# Patient Record
Sex: FEMALE | Race: WHITE | NOT HISPANIC OR LATINO | URBAN - METROPOLITAN AREA
[De-identification: names, ages, dates, MRNs, and addresses within clinical notes are randomized per-mention and may not be internally consistent; named-entity substitution may affect disease eponyms.]

---

## 2023-02-02 PROBLEM — R79.89 ELEVATED LFTS: Status: ACTIVE | Noted: 2023-02-02

## 2023-02-02 PROBLEM — E78.1 TYPE 2 DIABETES MELLITUS WITH HYPERTRIGLYCERIDEMIA (MULTI): Status: ACTIVE | Noted: 2023-02-02

## 2023-02-02 PROBLEM — E11.9 DIABETES MELLITUS, TYPE 2 (MULTI): Status: ACTIVE | Noted: 2023-02-02

## 2023-02-02 PROBLEM — E03.9 HYPOTHYROIDISM: Status: ACTIVE | Noted: 2023-02-02

## 2023-02-02 PROBLEM — R53.83 FATIGUE: Status: ACTIVE | Noted: 2023-02-02

## 2023-02-02 PROBLEM — R01.1 MURMUR, CARDIAC: Status: ACTIVE | Noted: 2023-02-02

## 2023-02-02 PROBLEM — I35.2 AORTIC INSUFFICIENCY WITH AORTIC STENOSIS: Status: ACTIVE | Noted: 2023-02-02

## 2023-02-02 PROBLEM — K29.70 GASTRITIS: Status: ACTIVE | Noted: 2023-02-02

## 2023-02-02 PROBLEM — E66.812 CLASS 2 OBESITY WITH BODY MASS INDEX (BMI) OF 36.0 TO 36.9 IN ADULT: Status: ACTIVE | Noted: 2023-02-02

## 2023-02-02 PROBLEM — D72.829 LEUKOCYTOSIS: Status: ACTIVE | Noted: 2023-02-02

## 2023-02-02 PROBLEM — R10.9 RIGHT FLANK PAIN: Status: ACTIVE | Noted: 2023-02-02

## 2023-02-02 PROBLEM — R79.89 ELEVATED SERUM CREATININE: Status: ACTIVE | Noted: 2023-02-02

## 2023-02-02 PROBLEM — N28.89 RENAL MASS, LEFT: Status: ACTIVE | Noted: 2023-02-02

## 2023-02-02 PROBLEM — E66.01 CLASS 2 SEVERE OBESITY WITH SERIOUS COMORBIDITY AND BODY MASS INDEX (BMI) OF 36.0 TO 36.9 IN ADULT (MULTI): Status: ACTIVE | Noted: 2023-02-02

## 2023-02-02 PROBLEM — I26.99 PULMONARY EMBOLISM (MULTI): Status: ACTIVE | Noted: 2023-02-02

## 2023-02-02 PROBLEM — E78.5 HLD (HYPERLIPIDEMIA): Status: ACTIVE | Noted: 2023-02-02

## 2023-02-02 PROBLEM — I26.99 PULMONARY EMBOLISM, BILATERAL (MULTI): Status: ACTIVE | Noted: 2023-02-02

## 2023-02-02 PROBLEM — E66.9 CLASS 2 OBESITY WITH BODY MASS INDEX (BMI) OF 36.0 TO 36.9 IN ADULT: Status: ACTIVE | Noted: 2023-02-02

## 2023-02-02 PROBLEM — M67.90 NODULE OF FLEXOR TENDON SHEATH: Status: ACTIVE | Noted: 2023-02-02

## 2023-02-02 PROBLEM — M25.562 LEFT KNEE PAIN: Status: ACTIVE | Noted: 2023-02-02

## 2023-02-02 PROBLEM — E11.69 TYPE 2 DIABETES MELLITUS WITH HYPERTRIGLYCERIDEMIA (MULTI): Status: ACTIVE | Noted: 2023-02-02

## 2023-02-02 PROBLEM — G47.00 INSOMNIA: Status: ACTIVE | Noted: 2023-02-02

## 2023-02-02 PROBLEM — D64.9 ANEMIA: Status: ACTIVE | Noted: 2023-02-02

## 2023-02-02 PROBLEM — E11.22: Status: ACTIVE | Noted: 2023-02-02

## 2023-02-02 PROBLEM — I35.1 MODERATE AORTIC REGURGITATION: Status: ACTIVE | Noted: 2023-02-02

## 2023-02-02 PROBLEM — E78.1 HYPERTRIGLYCERIDEMIA: Status: ACTIVE | Noted: 2023-02-02

## 2023-02-02 PROBLEM — E83.52 HYPERCALCEMIA: Status: ACTIVE | Noted: 2023-02-02

## 2023-02-02 PROBLEM — E66.812 CLASS 2 SEVERE OBESITY WITH SERIOUS COMORBIDITY AND BODY MASS INDEX (BMI) OF 36.0 TO 36.9 IN ADULT: Status: ACTIVE | Noted: 2023-02-02

## 2023-02-02 PROBLEM — K63.5 COLON POLYP: Status: ACTIVE | Noted: 2023-02-02

## 2023-02-02 PROBLEM — E66.9 OBESITY: Status: ACTIVE | Noted: 2023-02-02

## 2023-02-02 PROBLEM — I10 HYPERTENSION: Status: ACTIVE | Noted: 2023-02-02

## 2023-02-02 PROBLEM — I35.0 AORTIC STENOSIS: Status: ACTIVE | Noted: 2023-02-02

## 2023-02-02 PROBLEM — N18.31 CKD STAGE G3A/A1, GFR 45-59 AND ALBUMIN CREATININE RATIO <30 MG/G (MULTI): Status: ACTIVE | Noted: 2023-02-02

## 2023-02-02 RX ORDER — ASPIRIN 81 MG/1
1 TABLET ORAL DAILY
COMMUNITY
Start: 2020-01-30

## 2023-02-02 RX ORDER — ESTRADIOL 1 MG/1
1 TABLET ORAL DAILY
COMMUNITY
Start: 2020-01-30

## 2023-02-02 RX ORDER — LISINOPRIL AND HYDROCHLOROTHIAZIDE 12.5; 2 MG/1; MG/1
1 TABLET ORAL DAILY
COMMUNITY
Start: 2020-01-30 | End: 2023-03-16 | Stop reason: SDUPTHER

## 2023-02-02 RX ORDER — TRAZODONE HYDROCHLORIDE 50 MG/1
1-2 TABLET ORAL NIGHTLY PRN
COMMUNITY
Start: 2020-01-30 | End: 2023-03-16 | Stop reason: SDUPTHER

## 2023-02-02 RX ORDER — BLOOD SUGAR DIAGNOSTIC
STRIP MISCELLANEOUS
COMMUNITY
Start: 2022-04-26

## 2023-02-02 RX ORDER — LEVOTHYROXINE SODIUM 50 UG/1
1 TABLET ORAL DAILY
COMMUNITY
Start: 2020-01-30 | End: 2023-03-16 | Stop reason: SDUPTHER

## 2023-02-02 RX ORDER — MULTIVIT-MIN/FA/LYCOPEN/LUTEIN .4-300-25
1 TABLET ORAL DAILY
COMMUNITY
Start: 2020-01-30

## 2023-02-02 RX ORDER — METFORMIN HYDROCHLORIDE 1000 MG/1
1 TABLET ORAL
COMMUNITY
Start: 2020-01-30 | End: 2023-03-16 | Stop reason: SDUPTHER

## 2023-03-15 ASSESSMENT — ENCOUNTER SYMPTOMS
DIARRHEA: 0
UNEXPECTED WEIGHT CHANGE: 0
PALPITATIONS: 0
VOMITING: 0
BLOOD IN STOOL: 0
NAUSEA: 0
CHEST TIGHTNESS: 0
SHORTNESS OF BREATH: 0
ABDOMINAL PAIN: 0
APPETITE CHANGE: 0
ACTIVITY CHANGE: 0
CONSTIPATION: 0

## 2023-03-16 ENCOUNTER — OFFICE VISIT (OUTPATIENT)
Dept: PRIMARY CARE | Facility: CLINIC | Age: 73
End: 2023-03-16
Payer: MEDICARE

## 2023-03-16 VITALS
SYSTOLIC BLOOD PRESSURE: 118 MMHG | DIASTOLIC BLOOD PRESSURE: 62 MMHG | BODY MASS INDEX: 34.78 KG/M2 | HEART RATE: 100 BPM | HEIGHT: 61 IN | WEIGHT: 184.2 LBS | TEMPERATURE: 97.1 F

## 2023-03-16 DIAGNOSIS — E03.9 HYPOTHYROIDISM, UNSPECIFIED TYPE: ICD-10-CM

## 2023-03-16 DIAGNOSIS — Z00.00 MEDICARE ANNUAL WELLNESS VISIT, SUBSEQUENT: Primary | ICD-10-CM

## 2023-03-16 DIAGNOSIS — D64.9 ANEMIA, UNSPECIFIED TYPE: ICD-10-CM

## 2023-03-16 DIAGNOSIS — I10 HYPERTENSION, UNSPECIFIED TYPE: ICD-10-CM

## 2023-03-16 DIAGNOSIS — E11.9 TYPE 2 DIABETES MELLITUS WITHOUT COMPLICATION, WITHOUT LONG-TERM CURRENT USE OF INSULIN (MULTI): ICD-10-CM

## 2023-03-16 DIAGNOSIS — G47.00 INSOMNIA, UNSPECIFIED TYPE: ICD-10-CM

## 2023-03-16 DIAGNOSIS — E78.5 HYPERLIPIDEMIA, UNSPECIFIED HYPERLIPIDEMIA TYPE: ICD-10-CM

## 2023-03-16 PROCEDURE — G0439 PPPS, SUBSEQ VISIT: HCPCS | Performed by: FAMILY MEDICINE

## 2023-03-16 PROCEDURE — 1159F MED LIST DOCD IN RCRD: CPT | Performed by: FAMILY MEDICINE

## 2023-03-16 PROCEDURE — 1160F RVW MEDS BY RX/DR IN RCRD: CPT | Performed by: FAMILY MEDICINE

## 2023-03-16 PROCEDURE — 1036F TOBACCO NON-USER: CPT | Performed by: FAMILY MEDICINE

## 2023-03-16 PROCEDURE — 1170F FXNL STATUS ASSESSED: CPT | Performed by: FAMILY MEDICINE

## 2023-03-16 PROCEDURE — 3074F SYST BP LT 130 MM HG: CPT | Performed by: FAMILY MEDICINE

## 2023-03-16 PROCEDURE — 3078F DIAST BP <80 MM HG: CPT | Performed by: FAMILY MEDICINE

## 2023-03-16 PROCEDURE — 1157F ADVNC CARE PLAN IN RCRD: CPT | Performed by: FAMILY MEDICINE

## 2023-03-16 RX ORDER — TRAZODONE HYDROCHLORIDE 50 MG/1
50-100 TABLET ORAL NIGHTLY PRN
Qty: 90 TABLET | Refills: 1 | Status: SHIPPED | OUTPATIENT
Start: 2023-03-16 | End: 2023-06-12

## 2023-03-16 RX ORDER — METFORMIN HYDROCHLORIDE 1000 MG/1
1000 TABLET ORAL
Qty: 90 TABLET | Refills: 1 | Status: SHIPPED | OUTPATIENT
Start: 2023-03-16 | End: 2023-05-22

## 2023-03-16 RX ORDER — LEVOTHYROXINE SODIUM 50 UG/1
50 TABLET ORAL
Qty: 90 TABLET | Refills: 1 | Status: SHIPPED | OUTPATIENT
Start: 2023-03-16 | End: 2023-08-01

## 2023-03-16 RX ORDER — LISINOPRIL AND HYDROCHLOROTHIAZIDE 12.5; 2 MG/1; MG/1
1 TABLET ORAL DAILY
Qty: 90 TABLET | Refills: 1 | Status: SHIPPED | OUTPATIENT
Start: 2023-03-16 | End: 2023-09-11 | Stop reason: SDUPTHER

## 2023-03-16 ASSESSMENT — ACTIVITIES OF DAILY LIVING (ADL)
DOING_HOUSEWORK: INDEPENDENT
GROCERY_SHOPPING: INDEPENDENT
MANAGING_FINANCES: INDEPENDENT
BATHING: INDEPENDENT
TAKING_MEDICATION: INDEPENDENT
DRESSING: INDEPENDENT

## 2023-03-16 ASSESSMENT — PATIENT HEALTH QUESTIONNAIRE - PHQ9: 1. LITTLE INTEREST OR PLEASURE IN DOING THINGS: NOT AT ALL

## 2023-03-16 NOTE — PROGRESS NOTES
"Subjective   Reason for Visit: Justin Roberson is an 72 y.o. female here for a Medicare Wellness visit.       HPI  72-year-old female presents for MWV and  f/u       hospitalization 11/27/22-11/30/22 for covid/ bilateral subsegmental PE  US of legs was neg; was +covid  Now seeing cardio-dr contreras. had been seeing  cardio prior   on eliquis    on estradiol- rx'd by gyn dr wagner- pt reports that her heme said it was ok to cont the estradiol  no hx of prior DVT/PE    was then readmitted 12/12/22 with bright red blood per rectum-   saw GI dr murillo   12/15/22 EGD +gastritis; recommend protonix but she never started due to cost. had f/u with GI;  no symptoms so not taking any PPI  12/15/22 colonoscopy +polyp/hemorrhoids/diverticulosis; f/u 5 yrs.   was given iv iron in hospital    no further blood in stool.   no reflex  bowels are reg    no further chest pains. no palps, sob    had CT calcium score in 5/2021 + 65   Hx hypertriglyceridemai/AR/AS  tried tricor and zetia but couldn't tolerate; cardio suggested repatha  cant tolerate statins or lovaza     4/2022 mammo-neg; no breast changes     hx hypothyrodisim- on levothyroxine 50mcg      hx DM2- diagnosed over 20 yrs ago  on metformin 1000mg BID  sees optho- wears glasses  has been well controlled      hx of HTN- on lisinopril/hctz  tries healthy diet     DEXA- 4/2021- normal       Shingrix- not yet;   prevnar 9/2020  Pneumovax- had  Flu shot- had   covid- had and had booster     She denies any chest pains, palpitations, edema, shortness of breath, abdominal pains, reflux, nausea, vomiting, diarrhea, constipation, blood in stool, melena.   hx of murmur/AR/AS      hx CRI/left renal mass-   sees nephro dr aparicio and urologist- had f/u MRI in feb and recommended 1 yr fu          /62   Pulse 100   Temp 36.2 °C (97.1 °F)   Ht 1.549 m (5' 1\")   Wt 83.6 kg (184 lb 3.2 oz)   BMI 34.80 kg/m²     Lab Results   Component Value Date    WBC 9.1 03/10/2022    HGB 11.4 " "(L) 03/10/2022    HCT 37.4 03/10/2022    MCV 90 03/10/2022     03/10/2022       Chemistry    Lab Results   Component Value Date/Time     08/15/2022 1132    K 4.1 08/15/2022 1132     08/15/2022 1132    CO2 23 08/15/2022 1132    BUN 14 08/15/2022 1132    CREATININE 1.19 (H) 08/15/2022 1132    Lab Results   Component Value Date/Time    CALCIUM 9.9 08/15/2022 1132    ALKPHOS 71 08/15/2022 1132    AST 35 08/15/2022 1132    ALT 24 08/15/2022 1132    BILITOT 0.3 08/15/2022 1132           Lab Results   Component Value Date    CHOL 257 (H) 08/15/2022    CHOL 247 (H) 01/25/2022    CHOL 259 (H) 11/02/2021     Lab Results   Component Value Date    HDL 55.3 08/15/2022    HDL 53.4 01/25/2022    HDL 55.0 11/02/2021     No results found for: LDLCALC  Lab Results   Component Value Date    TRIG 624 (H) 08/15/2022    TRIG 426 (H) 01/25/2022    TRIG 607 (H) 11/02/2021     No components found for: CHOLHDL    Patient Self Assessment of Health Status  Patient Self Assessment: Good    Nutrition and Exercise  Current Diet: Well Balanced Diet  Adequate Fluid Intake: Yes  Caffeine: No  Exercise Frequency: Infrequently    Functional Ability/Level of Safety  Cognitive Impairment Observed: No cognitive impairment observed    Home Safety Risk Factors: None    Patient Care Team:  Khalida Zamora DO as PCP - General  Khalida Zamora DO as PCP - [employer] PCP     Review of Systems   Constitutional:  Negative for activity change, appetite change and unexpected weight change.   Respiratory:  Negative for chest tightness and shortness of breath.    Cardiovascular:  Negative for chest pain, palpitations and leg swelling.   Gastrointestinal:  Negative for abdominal pain, blood in stool, constipation, diarrhea, nausea and vomiting.       Medicare Wellness Visit Billing Compliance Not Met      Objective   Vitals:  /62   Pulse 100   Temp 36.2 °C (97.1 °F)   Ht 1.549 m (5' 1\")   Wt 83.6 kg (184 lb 3.2 oz)   " BMI 34.80 kg/m²       Physical Exam  Vitals and nursing note reviewed.   Constitutional:       Appearance: Normal appearance. She is normal weight.   HENT:      Head: Normocephalic and atraumatic.      Right Ear: Tympanic membrane, ear canal and external ear normal.      Left Ear: Tympanic membrane, ear canal and external ear normal.      Nose: Nose normal.      Mouth/Throat:      Mouth: Mucous membranes are moist.      Pharynx: Oropharynx is clear.   Eyes:      Extraocular Movements: Extraocular movements intact.      Conjunctiva/sclera: Conjunctivae normal.      Pupils: Pupils are equal, round, and reactive to light.   Cardiovascular:      Rate and Rhythm: Normal rate and regular rhythm.   Pulmonary:      Effort: Pulmonary effort is normal.      Breath sounds: Normal breath sounds.   Abdominal:      General: Abdomen is flat. Bowel sounds are normal.      Palpations: Abdomen is soft.   Musculoskeletal:         General: Normal range of motion.      Cervical back: Neck supple.   Skin:     General: Skin is warm and dry.   Neurological:      General: No focal deficit present.      Mental Status: She is alert and oriented to person, place, and time.   Psychiatric:         Mood and Affect: Mood normal.         Behavior: Behavior normal.         Thought Content: Thought content normal.         Judgment: Judgment normal.         Assessment/Plan   Problem List Items Addressed This Visit          Nervous    Insomnia    Relevant Medications    traZODone (Desyrel) 50 mg tablet       Circulatory    Hypertension    Relevant Medications    lisinopriL-hydrochlorothiazide 20-12.5 mg tablet    Other Relevant Orders    Comprehensive Metabolic Panel       Endocrine/Metabolic    Diabetes mellitus, type 2 (CMS/HCC)    Relevant Medications    metFORMIN (Glucophage) 1,000 mg tablet    Other Relevant Orders    Comprehensive Metabolic Panel    Hemoglobin A1C    Hypothyroidism    Relevant Medications    levothyroxine (Synthroid, Levoxyl) 50  mcg tablet    Other Relevant Orders    Thyroid Stimulating Hormone    Free T4 Index       Hematologic    Anemia       Other    HLD (hyperlipidemia)    Relevant Orders    Comprehensive Metabolic Panel    Lipid Panel     Other Visit Diagnoses       Medicare annual wellness visit, subsequent    -  Primary          Monitor BP  f/u with heme,  GI, cardio, nephro, urology  4/2022 mammo-neg; recheck in april 4/2021 DEXA-normal  check labs  f/u with specialists  5/2021 CT calcium score  4/2022 mammo-neg; recheck mammo- had scheduled in may and has order  4/2021 DEXA-normal  2022 EGD/colonoscopy- fu 5 yrs  annual optho  11/2021 urine MA-neg; sees nephro  prevnar 9/2020  pneumovax had  had covid shots   flu shot - had  shingrix recommended- defers due to cost but will check again   healthy lifestyle-diet, exercise.

## 2023-03-17 LAB
NON-UH HIE A/G RATIO: 1
NON-UH HIE ALB: 3.5 G/DL (ref 3.4–5)
NON-UH HIE ALK PHOS: 76 UNIT/L (ref 46–116)
NON-UH HIE BILIRUBIN, TOTAL: 0.4 MG/DL (ref 0.2–1)
NON-UH HIE BUN/CREAT RATIO: 13.8
NON-UH HIE BUN: 18 MG/DL (ref 9–23)
NON-UH HIE CALCIUM: 9.5 MG/DL (ref 8.7–10.4)
NON-UH HIE CALCULATED LDL CHOLESTEROL: ABNORMAL MG/DL (ref 60–130)
NON-UH HIE CALCULATED OSMOLALITY: 277 MOSM/KG (ref 275–295)
NON-UH HIE CHLORIDE: 103 MMOL/L (ref 98–107)
NON-UH HIE CHOLESTEROL: 238 MG/DL (ref 100–200)
NON-UH HIE CO2, VENOUS: 22 MMOL/L (ref 20–31)
NON-UH HIE CREATININE: 1.3 MG/DL (ref 0.5–0.8)
NON-UH HIE FREE T4: 1.17 NG/DL (ref 0.89–1.76)
NON-UH HIE GFR AA: 49
NON-UH HIE GLOBULIN: 3.4 G/DL
NON-UH HIE GLOMERULAR FILTRATION RATE: 40 ML/MIN/1.73M?
NON-UH HIE GLUCOSE: 123 MG/DL (ref 74–106)
NON-UH HIE GOT: 44 UNIT/L (ref 15–37)
NON-UH HIE GPT: 27 UNIT/L (ref 10–49)
NON-UH HIE HDL CHOLESTEROL: 56 MG/DL (ref 40–60)
NON-UH HIE HGB A1C: 5.9 %
NON-UH HIE K: 4.8 MMOL/L (ref 3.5–5.1)
NON-UH HIE NA: 137 MMOL/L (ref 135–145)
NON-UH HIE TOTAL CHOL/HDL CHOL RATIO: 4.2
NON-UH HIE TOTAL PROTEIN: 6.9 G/DL (ref 5.7–8.2)
NON-UH HIE TRIGLYCERIDES: 476 MG/DL (ref 30–150)
NON-UH HIE TSH: 1.67 UIU/ML (ref 0.55–4.78)

## 2023-03-20 ENCOUNTER — TELEPHONE (OUTPATIENT)
Dept: PRIMARY CARE | Facility: CLINIC | Age: 73
End: 2023-03-20
Payer: MEDICARE

## 2023-05-16 ENCOUNTER — TELEPHONE (OUTPATIENT)
Dept: PRIMARY CARE | Facility: CLINIC | Age: 73
End: 2023-05-16
Payer: MEDICARE

## 2023-05-16 NOTE — TELEPHONE ENCOUNTER
----- Message from Khalida Zamora DO sent at 5/15/2023  2:34 PM EDT -----  Let pt know her mammogram was normal.  Repeat in 1 year.

## 2023-05-22 DIAGNOSIS — E11.9 TYPE 2 DIABETES MELLITUS WITHOUT COMPLICATION, WITHOUT LONG-TERM CURRENT USE OF INSULIN (MULTI): ICD-10-CM

## 2023-05-22 RX ORDER — METFORMIN HYDROCHLORIDE 1000 MG/1
TABLET ORAL
Qty: 180 TABLET | Refills: 3 | Status: SHIPPED | OUTPATIENT
Start: 2023-05-22 | End: 2024-01-11 | Stop reason: SDUPTHER

## 2023-06-12 DIAGNOSIS — G47.00 INSOMNIA, UNSPECIFIED TYPE: ICD-10-CM

## 2023-06-12 RX ORDER — TRAZODONE HYDROCHLORIDE 50 MG/1
TABLET ORAL
Qty: 90 TABLET | Refills: 1 | Status: SHIPPED | OUTPATIENT
Start: 2023-06-12 | End: 2023-06-14

## 2023-06-13 DIAGNOSIS — G47.00 INSOMNIA, UNSPECIFIED TYPE: ICD-10-CM

## 2023-06-14 RX ORDER — TRAZODONE HYDROCHLORIDE 50 MG/1
TABLET ORAL
Qty: 90 TABLET | Refills: 1 | Status: SHIPPED | OUTPATIENT
Start: 2023-06-14 | End: 2023-07-17

## 2023-07-01 LAB — HEMOGLOBIN A1C/HEMOGLOBIN TOTAL IN BLOOD EXTERNAL: 5.9 %

## 2023-07-14 DIAGNOSIS — G47.00 INSOMNIA, UNSPECIFIED TYPE: ICD-10-CM

## 2023-07-17 RX ORDER — TRAZODONE HYDROCHLORIDE 50 MG/1
TABLET ORAL
Qty: 90 TABLET | Refills: 3 | Status: SHIPPED | OUTPATIENT
Start: 2023-07-17 | End: 2023-09-11 | Stop reason: SDUPTHER

## 2023-08-01 DIAGNOSIS — E03.9 HYPOTHYROIDISM, UNSPECIFIED TYPE: ICD-10-CM

## 2023-08-01 RX ORDER — LEVOTHYROXINE SODIUM 50 UG/1
50 TABLET ORAL
Qty: 90 TABLET | Refills: 3 | Status: SHIPPED | OUTPATIENT
Start: 2023-08-01 | End: 2024-06-05

## 2023-09-11 ENCOUNTER — LAB (OUTPATIENT)
Dept: LAB | Facility: LAB | Age: 73
End: 2023-09-11
Payer: MEDICARE

## 2023-09-11 ENCOUNTER — OFFICE VISIT (OUTPATIENT)
Dept: PRIMARY CARE | Facility: CLINIC | Age: 73
End: 2023-09-11
Payer: MEDICARE

## 2023-09-11 VITALS
HEIGHT: 61 IN | TEMPERATURE: 97.5 F | SYSTOLIC BLOOD PRESSURE: 118 MMHG | BODY MASS INDEX: 34.51 KG/M2 | WEIGHT: 182.8 LBS | HEART RATE: 100 BPM | DIASTOLIC BLOOD PRESSURE: 55 MMHG

## 2023-09-11 DIAGNOSIS — G47.00 INSOMNIA, UNSPECIFIED TYPE: ICD-10-CM

## 2023-09-11 DIAGNOSIS — N18.31 CKD STAGE G3A/A1, GFR 45-59 AND ALBUMIN CREATININE RATIO <30 MG/G (MULTI): ICD-10-CM

## 2023-09-11 DIAGNOSIS — E03.9 HYPOTHYROIDISM, UNSPECIFIED TYPE: ICD-10-CM

## 2023-09-11 DIAGNOSIS — E11.69 TYPE 2 DIABETES MELLITUS WITH HYPERTRIGLYCERIDEMIA (MULTI): ICD-10-CM

## 2023-09-11 DIAGNOSIS — E78.1 TYPE 2 DIABETES MELLITUS WITH HYPERTRIGLYCERIDEMIA (MULTI): ICD-10-CM

## 2023-09-11 DIAGNOSIS — R53.83 OTHER FATIGUE: ICD-10-CM

## 2023-09-11 DIAGNOSIS — E11.9 TYPE 2 DIABETES MELLITUS WITHOUT COMPLICATION, WITHOUT LONG-TERM CURRENT USE OF INSULIN (MULTI): ICD-10-CM

## 2023-09-11 DIAGNOSIS — I10 HYPERTENSION, UNSPECIFIED TYPE: ICD-10-CM

## 2023-09-11 DIAGNOSIS — E66.01 CLASS 2 SEVERE OBESITY WITH SERIOUS COMORBIDITY AND BODY MASS INDEX (BMI) OF 36.0 TO 36.9 IN ADULT, UNSPECIFIED OBESITY TYPE (MULTI): ICD-10-CM

## 2023-09-11 DIAGNOSIS — E78.1 HYPERTRIGLYCERIDEMIA: ICD-10-CM

## 2023-09-11 DIAGNOSIS — I10 HYPERTENSION, UNSPECIFIED TYPE: Primary | ICD-10-CM

## 2023-09-11 DIAGNOSIS — I26.99 PULMONARY EMBOLISM, BILATERAL (MULTI): ICD-10-CM

## 2023-09-11 PROCEDURE — 84479 ASSAY OF THYROID (T3 OR T4): CPT

## 2023-09-11 PROCEDURE — 3078F DIAST BP <80 MM HG: CPT | Performed by: FAMILY MEDICINE

## 2023-09-11 PROCEDURE — 3044F HG A1C LEVEL LT 7.0%: CPT | Performed by: FAMILY MEDICINE

## 2023-09-11 PROCEDURE — 84443 ASSAY THYROID STIM HORMONE: CPT

## 2023-09-11 PROCEDURE — 1157F ADVNC CARE PLAN IN RCRD: CPT | Performed by: FAMILY MEDICINE

## 2023-09-11 PROCEDURE — 99214 OFFICE O/P EST MOD 30 MIN: CPT | Performed by: FAMILY MEDICINE

## 2023-09-11 PROCEDURE — 83036 HEMOGLOBIN GLYCOSYLATED A1C: CPT

## 2023-09-11 PROCEDURE — 36415 COLL VENOUS BLD VENIPUNCTURE: CPT

## 2023-09-11 PROCEDURE — 84436 ASSAY OF TOTAL THYROXINE: CPT

## 2023-09-11 PROCEDURE — 80053 COMPREHEN METABOLIC PANEL: CPT

## 2023-09-11 PROCEDURE — 85027 COMPLETE CBC AUTOMATED: CPT

## 2023-09-11 PROCEDURE — 3074F SYST BP LT 130 MM HG: CPT | Performed by: FAMILY MEDICINE

## 2023-09-11 PROCEDURE — 1036F TOBACCO NON-USER: CPT | Performed by: FAMILY MEDICINE

## 2023-09-11 PROCEDURE — 1125F AMNT PAIN NOTED PAIN PRSNT: CPT | Performed by: FAMILY MEDICINE

## 2023-09-11 PROCEDURE — 80061 LIPID PANEL: CPT

## 2023-09-11 PROCEDURE — 3008F BODY MASS INDEX DOCD: CPT | Performed by: FAMILY MEDICINE

## 2023-09-11 PROCEDURE — 1159F MED LIST DOCD IN RCRD: CPT | Performed by: FAMILY MEDICINE

## 2023-09-11 PROCEDURE — 1160F RVW MEDS BY RX/DR IN RCRD: CPT | Performed by: FAMILY MEDICINE

## 2023-09-11 RX ORDER — LISINOPRIL AND HYDROCHLOROTHIAZIDE 12.5; 2 MG/1; MG/1
1 TABLET ORAL DAILY
Qty: 90 TABLET | Refills: 1 | Status: SHIPPED | OUTPATIENT
Start: 2023-09-11 | End: 2024-05-09

## 2023-09-11 RX ORDER — TRAZODONE HYDROCHLORIDE 50 MG/1
TABLET ORAL
Qty: 180 TABLET | Refills: 1 | Status: SHIPPED | OUTPATIENT
Start: 2023-09-11 | End: 2023-09-12 | Stop reason: SDUPTHER

## 2023-09-11 ASSESSMENT — PATIENT HEALTH QUESTIONNAIRE - PHQ9
2. FEELING DOWN, DEPRESSED OR HOPELESS: NOT AT ALL
1. LITTLE INTEREST OR PLEASURE IN DOING THINGS: NOT AT ALL
SUM OF ALL RESPONSES TO PHQ9 QUESTIONS 1 AND 2: 0

## 2023-09-11 NOTE — PROGRESS NOTES
"Subjective   Patient ID: Justin Roberson is a 72 y.o. female who presents for Follow-up (6 month follow up.  Defers flu shot today. ).    HPI   72-year-old female presents for  f/u        hospitalization 11/27/22-11/30/22 for covid/ bilateral subsegmental PE  US of legs was neg; was +covid  Now seeing cardio-dr contreras.   offeliquis    on estradiol- rx'd by gyn dr wagner- pt reports that her heme and gyn said it was ok to cont the estradiol  no hx of prior DVT/PE     was then readmitted 12/12/22 with bright red blood per rectum-   saw GI dr murillo   12/15/22 EGD +gastritis; recommend protonix but she never started due to cost. had f/u with GI;  no symptoms so not taking any PPI  12/15/22 colonoscopy +polyp/hemorrhoids/diverticulosis; f/u 5 yrs.   was given iv iron in hospital     no further blood in stool.   no reflex  bowels are reg     no further chest pains. no palps, sob     had CT calcium score in 5/2021 + 65   Hx hypertriglyceridemai/AR/AS  tried tricor and zetia but couldn't tolerate; cardio suggested repatha  cant tolerate statins or lovaza  Cardio is going to recheck echo in dec    5/2023mammo-neg; no breast changes     hx hypothyrodisim- on levothyroxine 50mcg      hx DM2- diagnosed over 20 yrs ago  on metformin 1000mg BID  sees optho- wears glasses  has been well controlled      hx of HTN- on lisinopril/hctz  tries healthy diet     DEXA- 4/2021- normal   Will repeat next yr      Shingrix- not yet;   prevnar 9/2020  Pneumovax- had  Flu shot- in fall   covid- had and had booster     She denies any chest pains, palpitations, edema, shortness of breath, abdominal pains, reflux, nausea, vomiting, diarrhea, constipation, blood in stool, melena.   hx of murmur/AR/AS      hx CRI/left renal mass-   sees nephro dr aparicio and urologist- had f/u MRI in feb2023 and recommended 1 yr fu           Review of Systems  ROS as stated in HPI  Objective   /55   Pulse 100   Temp 36.4 °C (97.5 °F)   Ht 1.549 m (5' 1\") "   Wt 82.9 kg (182 lb 12.8 oz)   BMI 34.54 kg/m²     Physical Exam  Constitutional: A&O; NAD  HEENT: conjunctiva normal. EOMI; PERRLA; lids normal; TM's clear;   Neck: supple; No lymphadenopathy   Heart: RRR     Lungs: CTA bilateral   Abd: Soft, Nontender, Nondistended  Ext:  No edema;    Neuro: No gross neuro deficits     Psych: Judgment, orientation, mood, affect, insight wnl   Assessment/Plan   Problem List Items Addressed This Visit       CKD stage G3a/A1, GFR 45-59 and albumin creatinine ratio <30 mg/g (CMS/[facility])    Diabetes mellitus, type 2 (CMS/[facility])    Relevant Orders    Comprehensive Metabolic Panel    Hemoglobin A1C    Fatigue    Relevant Orders    CBC    Hypertension - Primary    Relevant Medications    lisinopriL-hydrochlorothiazide 20-12.5 mg tablet    Other Relevant Orders    Comprehensive Metabolic Panel    Hypertriglyceridemia    Relevant Orders    Comprehensive Metabolic Panel    Lipid Panel    Hypothyroidism    Relevant Orders    Thyroid Stimulating Hormone    Free T4 Index    Insomnia    Relevant Medications    traZODone (Desyrel) 50 mg tablet    Type 2 diabetes mellitus with hypertriglyceridemia (CMS/[facility])    Pulmonary embolism, bilateral (CMS/[facility])    Class 2 severe obesity with serious comorbidity and body mass index (BMI) of 36.0 to 36.9 in adult (CMS/[facility])     Monitor BP  f/u with specialists  5/2023 mammo-neg;   4/2021 DEXA-normal  check labs- fax to cardio  f/u with specialists  5/2021 CT calcium score  2022 EGD/colonoscopy- fu 5 yrs  annual optho  11/2021 urine MA-neg; sees nephro  prevnar 9/2020  pneumovax had  had covid shots   flu shot - will get in fall   shingrix recommended- defers due to cost - checked with her insurance  healthy lifestyle-diet, exercise.  Fu march for MWV or sooner if needed ROS as stated in HPI

## 2023-09-12 DIAGNOSIS — G47.00 INSOMNIA, UNSPECIFIED TYPE: ICD-10-CM

## 2023-09-12 LAB
ALANINE AMINOTRANSFERASE (SGPT) (U/L) IN SER/PLAS: 24 U/L (ref 7–45)
ALBUMIN (G/DL) IN SER/PLAS: 4.1 G/DL (ref 3.4–5)
ALKALINE PHOSPHATASE (U/L) IN SER/PLAS: 64 U/L (ref 33–136)
ANION GAP IN SER/PLAS: 19 MMOL/L (ref 10–20)
ASPARTATE AMINOTRANSFERASE (SGOT) (U/L) IN SER/PLAS: 26 U/L (ref 9–39)
BILIRUBIN TOTAL (MG/DL) IN SER/PLAS: 0.3 MG/DL (ref 0–1.2)
CALCIUM (MG/DL) IN SER/PLAS: 10.6 MG/DL (ref 8.6–10.6)
CARBON DIOXIDE, TOTAL (MMOL/L) IN SER/PLAS: 22 MMOL/L (ref 21–32)
CHLORIDE (MMOL/L) IN SER/PLAS: 103 MMOL/L (ref 98–107)
CHOLESTEROL (MG/DL) IN SER/PLAS: 255 MG/DL (ref 0–199)
CHOLESTEROL IN HDL (MG/DL) IN SER/PLAS: 50.9 MG/DL
CHOLESTEROL/HDL RATIO: 5
CREATININE (MG/DL) IN SER/PLAS: 1.53 MG/DL (ref 0.5–1.05)
ERYTHROCYTE DISTRIBUTION WIDTH (RATIO) BY AUTOMATED COUNT: 14.3 % (ref 11.5–14.5)
ERYTHROCYTE MEAN CORPUSCULAR HEMOGLOBIN CONCENTRATION (G/DL) BY AUTOMATED: 30.6 G/DL (ref 32–36)
ERYTHROCYTE MEAN CORPUSCULAR VOLUME (FL) BY AUTOMATED COUNT: 91 FL (ref 80–100)
ERYTHROCYTES (10*6/UL) IN BLOOD BY AUTOMATED COUNT: 4.3 X10E12/L (ref 4–5.2)
ESTIMATED AVERAGE GLUCOSE FOR HBA1C: 140 MG/DL
GFR FEMALE: 36 ML/MIN/1.73M2
GLUCOSE (MG/DL) IN SER/PLAS: 113 MG/DL (ref 74–99)
HEMATOCRIT (%) IN BLOOD BY AUTOMATED COUNT: 39.2 % (ref 36–46)
HEMOGLOBIN (G/DL) IN BLOOD: 12 G/DL (ref 12–16)
HEMOGLOBIN A1C/HEMOGLOBIN TOTAL IN BLOOD: 6.5 %
LDL: ABNORMAL MG/DL (ref 0–99)
LEUKOCYTES (10*3/UL) IN BLOOD BY AUTOMATED COUNT: 11.6 X10E9/L (ref 4.4–11.3)
NRBC (PER 100 WBCS) BY AUTOMATED COUNT: 0 /100 WBC (ref 0–0)
PLATELETS (10*3/UL) IN BLOOD AUTOMATED COUNT: 423 X10E9/L (ref 150–450)
POTASSIUM (MMOL/L) IN SER/PLAS: 4.5 MMOL/L (ref 3.5–5.3)
PROTEIN TOTAL: 7.4 G/DL (ref 6.4–8.2)
SODIUM (MMOL/L) IN SER/PLAS: 139 MMOL/L (ref 136–145)
THYROTROPIN (MIU/L) IN SER/PLAS BY DETECTION LIMIT <= 0.05 MIU/L: 1.54 MIU/L (ref 0.44–3.98)
THYROXINE (T4) (UG/DL) IN SER/PLAS: 16 UG/DL (ref 4.5–11.1)
THYROXINE (T4) FREE INDEX IN SER/PLAS BY CALCULATION: 2.9 (ref 1.6–4.7)
TRIGLYCERIDE (MG/DL) IN SER/PLAS: 694 MG/DL (ref 0–149)
TRIIODOTHYRONINE RESIN UPTAKE (T3RU) % IN SER/PLAS: 18 % (ref 24–41)
UREA NITROGEN (MG/DL) IN SER/PLAS: 30 MG/DL (ref 6–23)
VLDL: ABNORMAL MG/DL (ref 0–40)

## 2023-09-12 RX ORDER — TRAZODONE HYDROCHLORIDE 50 MG/1
TABLET ORAL
Qty: 180 TABLET | Refills: 1 | Status: SHIPPED | OUTPATIENT
Start: 2023-09-12 | End: 2023-09-20 | Stop reason: SDUPTHER

## 2023-09-14 ENCOUNTER — TELEPHONE (OUTPATIENT)
Dept: PRIMARY CARE | Facility: CLINIC | Age: 73
End: 2023-09-14
Payer: MEDICARE

## 2023-09-14 DIAGNOSIS — E03.9 HYPOTHYROIDISM, UNSPECIFIED TYPE: ICD-10-CM

## 2023-09-14 DIAGNOSIS — D72.829 LEUKOCYTOSIS, UNSPECIFIED TYPE: ICD-10-CM

## 2023-09-14 NOTE — TELEPHONE ENCOUNTER
Please let pt know that her triglycerides were extremely elevated at 694(should be less than 150).  I recommend she follow up with cardiologist to further discuss options.   Her  sugar was elevated at 113 and her HbA1c, the 3 mo avg of sugars was 6.5, which indicates relatively good control of her diabetes.    Her kidney function was again elevated at 1.53 (should be less than 1.05).    Her white blood cell count was slightly elevated and her thyroid levels were a little out of range.  I would like her to repeat her blood counts and thyroid in 6 wks   Enter order for cbc with diff dx leukocytosis and tsh/free T4 dx hypothyroidism   Please fax labs to her cardio

## 2023-09-20 ENCOUNTER — TELEPHONE (OUTPATIENT)
Dept: PRIMARY CARE | Facility: CLINIC | Age: 73
End: 2023-09-20
Payer: MEDICARE

## 2023-09-20 DIAGNOSIS — G47.00 INSOMNIA, UNSPECIFIED TYPE: ICD-10-CM

## 2023-09-20 RX ORDER — TRAZODONE HYDROCHLORIDE 50 MG/1
TABLET ORAL
Qty: 180 TABLET | Refills: 1 | Status: SHIPPED | OUTPATIENT
Start: 2023-09-20 | End: 2024-01-11 | Stop reason: SDUPTHER

## 2023-09-20 NOTE — TELEPHONE ENCOUNTER
Pt needs Trazodone (50 mg) refilled, pharmacy is [facility]. Pt has enough until weekend. Last prescription was sent to [facility], too expensive. Send to [facility].

## 2023-09-21 ENCOUNTER — APPOINTMENT (OUTPATIENT)
Dept: PRIMARY CARE | Facility: CLINIC | Age: 73
End: 2023-09-21
Payer: MEDICARE

## 2023-10-18 ENCOUNTER — TELEPHONE (OUTPATIENT)
Dept: PHARMACY | Facility: HOSPITAL | Age: 73
End: 2023-10-18
Payer: MEDICARE

## 2023-10-18 NOTE — TELEPHONE ENCOUNTER
Population Health: Outreach by Ambulatory Pharmacy Team    Payor: [employer]  Reason: Adherence  Medication: METFORMIN TAB 1000MG  Outcome: Left Voicemail    ERMELINDA ELLISON CPhT

## 2023-10-20 LAB
NON-UH HIE BASO COUNT: 0.04 X1000 (ref 0–0.2)
NON-UH HIE BASOS %: 0.4 %
NON-UH HIE DIFF?: NO
NON-UH HIE EOS COUNT: 0.5 X1000 (ref 0–0.5)
NON-UH HIE EOSIN %: 4.8 %
NON-UH HIE FREE T4: 1.26 NG/DL (ref 0.89–1.76)
NON-UH HIE HCT: 35.5 % (ref 36–46)
NON-UH HIE HGB: 11.6 G/DL (ref 12–16)
NON-UH HIE INSTR WBC: 10.4
NON-UH HIE LYMPH %: 43.1 %
NON-UH HIE LYMPH COUNT: 4.48 X1000 (ref 1.2–4.8)
NON-UH HIE MCH: 28.2 PG (ref 27–34)
NON-UH HIE MCHC: 32.8 G/DL (ref 32–37)
NON-UH HIE MCV: 86.1 FL (ref 80–100)
NON-UH HIE MONO %: 7.6 %
NON-UH HIE MONO COUNT: 0.79 X1000 (ref 0.1–1)
NON-UH HIE MPV: 8.9 FL (ref 7.4–10.4)
NON-UH HIE NEUTROPHIL %: 44 %
NON-UH HIE NEUTROPHIL COUNT (ANC): 4.57 X1000 (ref 1.4–8.8)
NON-UH HIE NUCLEATED RBC: 0 /100WBC
NON-UH HIE PLATELET: 386 X10 (ref 150–450)
NON-UH HIE RBC: 4.12 X10 (ref 4.2–5.4)
NON-UH HIE RDW: 15 % (ref 11.5–14.5)
NON-UH HIE TSH: 2.39 UIU/ML (ref 0.55–4.78)
NON-UH HIE WBC: 10.4 X10 (ref 4.5–11)

## 2023-10-23 ENCOUNTER — TELEPHONE (OUTPATIENT)
Dept: PRIMARY CARE | Facility: CLINIC | Age: 73
End: 2023-10-23
Payer: MEDICARE

## 2023-10-23 DIAGNOSIS — D64.9 ANEMIA, UNSPECIFIED TYPE: ICD-10-CM

## 2023-10-23 NOTE — TELEPHONE ENCOUNTER
----- Message from Khalida Zamora, DO sent at 10/21/2023  9:10 AM EDT -----  Please let pt know her repeat normal; cell count are now normal.  Her hemoglobin or red blood cell count is now slightly low at 11.6.  Should be about 12.  We can recheck this in a few weeks along with her iron levels  Enter order for cbc with diff, iron, ferritin, vit b12.

## 2023-10-31 ENCOUNTER — CLINICAL SUPPORT (OUTPATIENT)
Dept: PRIMARY CARE | Facility: CLINIC | Age: 73
End: 2023-10-31
Payer: MEDICARE

## 2023-10-31 DIAGNOSIS — Z23 ENCOUNTER FOR IMMUNIZATION: ICD-10-CM

## 2023-10-31 PROCEDURE — 90662 IIV NO PRSV INCREASED AG IM: CPT | Performed by: FAMILY MEDICINE

## 2023-10-31 PROCEDURE — G0008 ADMIN INFLUENZA VIRUS VAC: HCPCS | Performed by: FAMILY MEDICINE

## 2023-11-03 LAB
NON-UH HIE BASO COUNT: 0.06 X1000 (ref 0–0.2)
NON-UH HIE BASOS %: 0.6 %
NON-UH HIE DIFF?: NO
NON-UH HIE EOS COUNT: 0.59 X1000 (ref 0–0.5)
NON-UH HIE EOSIN %: 6 %
NON-UH HIE FERRITIN: 61 NG/ML (ref 10–291)
NON-UH HIE HCT: 35.1 % (ref 36–46)
NON-UH HIE HGB: 11.7 G/DL (ref 12–16)
NON-UH HIE INSTR WBC: 9.8
NON-UH HIE IRON: 77 UG/DL (ref 40–170)
NON-UH HIE LYMPH %: 32.3 %
NON-UH HIE LYMPH COUNT: 3.17 X1000 (ref 1.2–4.8)
NON-UH HIE MCH: 28.6 PG (ref 27–34)
NON-UH HIE MCHC: 33.2 G/DL (ref 32–37)
NON-UH HIE MCV: 86.2 FL (ref 80–100)
NON-UH HIE MONO %: 6 %
NON-UH HIE MONO COUNT: 0.59 X1000 (ref 0.1–1)
NON-UH HIE MPV: 8.9 FL (ref 7.4–10.4)
NON-UH HIE NEUTROPHIL %: 55 %
NON-UH HIE NEUTROPHIL COUNT (ANC): 5.4 X1000 (ref 1.4–8.8)
NON-UH HIE NUCLEATED RBC: 0 /100WBC
NON-UH HIE PLATELET: 356 X10 (ref 150–450)
NON-UH HIE RBC: 4.07 X10 (ref 4.2–5.4)
NON-UH HIE RDW: 14.8 % (ref 11.5–14.5)
NON-UH HIE SATURATION: 18.2 % (ref 20–50)
NON-UH HIE TIBC: 423 UG/ML (ref 250–425)
NON-UH HIE VITAMIN B12: 296 PG/ML (ref 211–911)
NON-UH HIE WBC: 9.8 X10 (ref 4.5–11)

## 2023-11-06 ENCOUNTER — TELEPHONE (OUTPATIENT)
Dept: PRIMARY CARE | Facility: CLINIC | Age: 73
End: 2023-11-06
Payer: MEDICARE

## 2023-11-15 ENCOUNTER — TELEPHONE (OUTPATIENT)
Dept: PHARMACY | Facility: HOSPITAL | Age: 73
End: 2023-11-15
Payer: MEDICARE

## 2023-11-15 NOTE — TELEPHONE ENCOUNTER
Population Health: Outreach by Ambulatory Pharmacy Team    Payor: [employer]  Reason: Adherence  Medication: Metformin 1,000 mg  Outcome: Patient Reached: Claims Adherence, Patient states she has supply at home and she has enough until her next follow up    Yandy Segundo, PharmD

## 2023-12-04 DIAGNOSIS — N28.89 RENAL MASS, LEFT: Primary | ICD-10-CM

## 2024-01-11 DIAGNOSIS — G47.00 INSOMNIA, UNSPECIFIED TYPE: ICD-10-CM

## 2024-01-11 DIAGNOSIS — E11.9 TYPE 2 DIABETES MELLITUS WITHOUT COMPLICATION, WITHOUT LONG-TERM CURRENT USE OF INSULIN (MULTI): ICD-10-CM

## 2024-01-12 RX ORDER — METFORMIN HYDROCHLORIDE 1000 MG/1
1000 TABLET ORAL
Qty: 180 TABLET | Refills: 1 | Status: SHIPPED | OUTPATIENT
Start: 2024-01-12 | End: 2024-03-07 | Stop reason: SDUPTHER

## 2024-01-12 RX ORDER — TRAZODONE HYDROCHLORIDE 50 MG/1
TABLET ORAL
Qty: 180 TABLET | Refills: 1 | Status: SHIPPED | OUTPATIENT
Start: 2024-01-12 | End: 2024-03-07 | Stop reason: SDUPTHER

## 2024-02-22 NOTE — PROGRESS NOTES
Subjective     Justin Roberson is a 73 y.o. female with two small L renal masses, one potential AML and another potential papillary RCC, who presents for 1 year FUV.     She denies gross hematuria. She is doing well. She states she must be put under anaesthesia to have MRI done as she is claustrophobic.     Repeat MRI was done at [facility] which makes it difficult for me to see the report. My personal review shows two growths on the posterior aspect of the kidney. One measures 2.4cm x 2.2cm. The other measures 1.7cm x 1.8cm.     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 Posterior mass is smaller than 2cm but concerning for papillary RCC.      Her renal function at last visit is borderline with EFGR of 48 which has been stable over the last year.      Past medical, surgical, family and social history were reviewed and unchanged since last visit besides what is in the HPI.               Review of Systems   All other systems reviewed and are negative.      Objective   Physical Exam  Gen: No acute distress     Psych: Alert and oriented x3     Neuro:  Normal ROM    Resp: Nonlabored respirations     CV: Regular rate and rhythm     Abd: S, NT, ND.     : Deferred    Skin: Warm, dry and intact without rashes     Lymphatics: No peripheral edema       Assessment/Plan   Problem List Items Addressed This Visit             ICD-10-CM    Renal mass, left - Primary N28.89     I reviewed the patient's kidney MRI results and we discussed this imaging in detail. Repeat MRI was done at [facility] which makes it difficult for me to see the report. My personal review shows two growths on the posterior aspect of the kidney. One measures 2.4cm x 2.2cm. The other measures 1.7cm x 1.8cm.      We will request for  radiology to review these images and discuss results thereafter. She requires anaesthesia for further MRI imaging. We will discuss this further prior to next surveillance visit.                     Plan:  Will request  radiology  to review her imaging done at [facility] as I do not have a read on this.  The one mass is slightly larger than my note but I have no imaging to compare too  Discuss results thereafter with virtual visit n 2-3 weeks      Scribe Attestation  By signing my name below, I, Katelyn Casalla, Scribe   attest that this documentation has been prepared under the direction and in the presence of Leonel Erickson MD MPH.

## 2024-02-23 ENCOUNTER — OFFICE VISIT (OUTPATIENT)
Dept: UROLOGY | Facility: CLINIC | Age: 74
End: 2024-02-23
Payer: MEDICARE

## 2024-02-23 ENCOUNTER — HOSPITAL ENCOUNTER (OUTPATIENT)
Dept: RADIOLOGY | Facility: EXTERNAL LOCATION | Age: 74
Discharge: HOME | End: 2024-02-23

## 2024-02-23 VITALS
WEIGHT: 177 LBS | DIASTOLIC BLOOD PRESSURE: 63 MMHG | HEIGHT: 61 IN | HEART RATE: 88 BPM | SYSTOLIC BLOOD PRESSURE: 129 MMHG | BODY MASS INDEX: 33.42 KG/M2

## 2024-02-23 DIAGNOSIS — N28.89 RENAL MASS, LEFT: ICD-10-CM

## 2024-02-23 DIAGNOSIS — N28.89 RENAL MASS, LEFT: Primary | ICD-10-CM

## 2024-02-23 PROCEDURE — 1159F MED LIST DOCD IN RCRD: CPT | Performed by: STUDENT IN AN ORGANIZED HEALTH CARE EDUCATION/TRAINING PROGRAM

## 2024-02-23 PROCEDURE — 99213 OFFICE O/P EST LOW 20 MIN: CPT | Performed by: STUDENT IN AN ORGANIZED HEALTH CARE EDUCATION/TRAINING PROGRAM

## 2024-02-23 PROCEDURE — 3074F SYST BP LT 130 MM HG: CPT | Performed by: STUDENT IN AN ORGANIZED HEALTH CARE EDUCATION/TRAINING PROGRAM

## 2024-02-23 PROCEDURE — 1157F ADVNC CARE PLAN IN RCRD: CPT | Performed by: STUDENT IN AN ORGANIZED HEALTH CARE EDUCATION/TRAINING PROGRAM

## 2024-02-23 PROCEDURE — 3008F BODY MASS INDEX DOCD: CPT | Performed by: STUDENT IN AN ORGANIZED HEALTH CARE EDUCATION/TRAINING PROGRAM

## 2024-02-23 PROCEDURE — 1125F AMNT PAIN NOTED PAIN PRSNT: CPT | Performed by: STUDENT IN AN ORGANIZED HEALTH CARE EDUCATION/TRAINING PROGRAM

## 2024-02-23 PROCEDURE — 1036F TOBACCO NON-USER: CPT | Performed by: STUDENT IN AN ORGANIZED HEALTH CARE EDUCATION/TRAINING PROGRAM

## 2024-02-23 PROCEDURE — 3078F DIAST BP <80 MM HG: CPT | Performed by: STUDENT IN AN ORGANIZED HEALTH CARE EDUCATION/TRAINING PROGRAM

## 2024-02-23 NOTE — ASSESSMENT & PLAN NOTE
I reviewed the patient's kidney MRI results and we discussed this imaging in detail. Repeat MRI was done at [facility] which makes it difficult for me to see the report. My personal review shows two growths on the posterior aspect of the kidney. One measures 2.4cm x 2.2cm. The other measures 1.7cm x 1.8cm.      We will request for  radiology to review these images and discuss results thereafter. She requires anaesthesia for further MRI imaging. We will discuss this further prior to next surveillance visit.

## 2024-02-27 DIAGNOSIS — E11.9 TYPE 2 DIABETES MELLITUS WITHOUT COMPLICATION, WITHOUT LONG-TERM CURRENT USE OF INSULIN (MULTI): ICD-10-CM

## 2024-02-27 DIAGNOSIS — I10 HYPERTENSION, UNSPECIFIED TYPE: ICD-10-CM

## 2024-02-27 DIAGNOSIS — E78.5 HYPERLIPIDEMIA, UNSPECIFIED HYPERLIPIDEMIA TYPE: ICD-10-CM

## 2024-02-27 DIAGNOSIS — R53.83 FATIGUE, UNSPECIFIED TYPE: ICD-10-CM

## 2024-02-27 DIAGNOSIS — E03.9 HYPOTHYROIDISM, UNSPECIFIED TYPE: ICD-10-CM

## 2024-02-27 LAB
NON-UH HIE A/G RATIO: 1
NON-UH HIE ALB: 3.5 G/DL (ref 3.4–5)
NON-UH HIE ALK PHOS: 78 UNIT/L (ref 45–117)
NON-UH HIE BILIRUBIN, TOTAL: 0.3 MG/DL (ref 0.3–1.2)
NON-UH HIE BUN/CREAT RATIO: 11.3
NON-UH HIE BUN: 17 MG/DL (ref 9–23)
NON-UH HIE CALCIUM: 9.6 MG/DL (ref 8.7–10.4)
NON-UH HIE CALCULATED LDL CHOLESTEROL: ABNORMAL MG/DL (ref 60–130)
NON-UH HIE CALCULATED OSMOLALITY: 278 MOSM/KG (ref 275–295)
NON-UH HIE CHLORIDE: 108 MMOL/L (ref 98–107)
NON-UH HIE CHOLESTEROL: 237 MG/DL (ref 100–200)
NON-UH HIE CO2, VENOUS: 22 MMOL/L (ref 20–31)
NON-UH HIE CREATININE: 1.5 MG/DL (ref 0.5–0.8)
NON-UH HIE FREE T4: 1.26 NG/DL (ref 0.89–1.76)
NON-UH HIE GFR AA: 41
NON-UH HIE GLOBULIN: 3.4 G/DL
NON-UH HIE GLOMERULAR FILTRATION RATE: 34 ML/MIN/1.73M?
NON-UH HIE GLUCOSE: 108 MG/DL (ref 74–106)
NON-UH HIE GOT: 22 UNIT/L (ref 15–37)
NON-UH HIE GPT: 21 UNIT/L (ref 10–49)
NON-UH HIE HCT: 36.3 % (ref 36–46)
NON-UH HIE HDL CHOLESTEROL: 54 MG/DL (ref 40–60)
NON-UH HIE HGB A1C: 6 %
NON-UH HIE HGB: 11.9 G/DL (ref 12–16)
NON-UH HIE INSTR WBC ND: 9.8
NON-UH HIE K: 4.6 MMOL/L (ref 3.5–5.1)
NON-UH HIE MCH: 27.9 PG (ref 27–34)
NON-UH HIE MCHC: 32.6 G/DL (ref 32–37)
NON-UH HIE MCV: 85.6 FL (ref 80–100)
NON-UH HIE MPV: 9.5 FL (ref 7.4–10.4)
NON-UH HIE NA: 138 MMOL/L (ref 135–145)
NON-UH HIE PLATELET: 347 X10 (ref 150–450)
NON-UH HIE RBC: 4.25 X10 (ref 4.2–5.4)
NON-UH HIE RDW: 14.4 % (ref 11.5–14.5)
NON-UH HIE TOTAL CHOL/HDL CHOL RATIO: 4.4
NON-UH HIE TOTAL PROTEIN: 6.9 G/DL (ref 5.7–8.2)
NON-UH HIE TRIGLYCERIDES: 545 MG/DL (ref 30–150)
NON-UH HIE TSH: 0.86 UIU/ML (ref 0.55–4.78)
NON-UH HIE WBC: 9.8 X10 (ref 4.5–11)

## 2024-03-05 LAB — HEMOGLOBIN A1C/HEMOGLOBIN TOTAL IN BLOOD EXTERNAL: 6 %

## 2024-03-06 NOTE — PROGRESS NOTES
Subjective   Reason for Visit: Justin Roberson is an 73 y.o. female here for a Medicare Wellness visit.     Past Medical, Surgical, and Family History reviewed and updated in chart.    Reviewed all medications by prescribing practitioner or clinical pharmacist (such as prescriptions, OTCs, herbal therapies and supplements) and documented in the medical record.    HPI  73-year-old female presents for  MWV and f/u      Recent labs:     Hba1c 6 glucose 108  Cr 1.5; had recent MRI with nephro mohan  greq a little   Tsh, free t4 wnl  Hb 11.9        hospitalization 11/27/22-11/30/22 for covid/ bilateral subsegmental PE  US of legs was neg; was +covid  seeing cardio-dr contreras.   Off eliquis    on estradiol- rx'd by gyn dr wagner- pt reports that her heme and gyn said it was ok to cont the estradiol  no hx of prior DVT/PE     was then readmitted 12/12/22 with bright red blood per rectum-   saw GI dr murillo   12/15/22 EGD +gastritis; recommend protonix but she never started due to cost. had f/u with GI;  no symptoms so not taking any PPI  12/15/22 colonoscopy +polyp/hemorrhoids/diverticulosis; f/u 5 yrs.   was given iv iron in hospital     no further blood in stool.   no reflex  bowels are reg     no further chest pains. no palps, sob     had CT calcium score in 5/2021 + 65   Hx hypertriglyceridemai/AR/AS  tried tricor and zetia but couldn't tolerate; cant tolerate statins or lovaza  Prior cardio suggested repatha  Now seeing cardio Delmis  Recent fu echo    5/2023 mammo-neg; no breast changes     hx hypothyrodisim- on levothyroxine 50mcg    no swelling     hx DM2- diagnosed over 20 yrs ago  on metformin 1000mg BID  sees optho- wears glasses  has been well controlled      hx of HTN- on lisinopril/hctz  tries healthy diet     DEXA- 4/2021- normal   Will repeat   Takes a MVI     Shingrix- not yet; insurance wont cover per pt  prevnar 9/2020  Pneumovax- had  Flu shot- had  covid- had   RSV- not yet- defers     She  "denies any chest pains, palpitations, edema, shortness of breath, abdominal pains, reflux, nausea, vomiting, diarrhea, constipation, blood in stool, melena.   hx of murmur/AR/AS      hx CRI/left renal mass-   sees nephro dr aparicio and urologist- had recent f/u MRI      Sees optho  Sees dentist  No mole changes        /75   Pulse 95   Temp 36.9 °C (98.5 °F)   Ht 1.549 m (5' 1\")   Wt 82.1 kg (181 lb)   BMI 34.20 kg/m²     Lab Results   Component Value Date    WBC 11.6 (H) 09/11/2023    HGB 12.0 09/11/2023    HCT 39.2 09/11/2023    MCV 91 09/11/2023     09/11/2023       Chemistry    Lab Results   Component Value Date/Time     09/11/2023 1242    K 4.5 09/11/2023 1242     09/11/2023 1242    CO2 22 09/11/2023 1242    BUN 30 (H) 09/11/2023 1242    CREATININE 1.53 (H) 09/11/2023 1242    Lab Results   Component Value Date/Time    CALCIUM 10.6 09/11/2023 1242    ALKPHOS 64 09/11/2023 1242    AST 26 09/11/2023 1242    ALT 24 09/11/2023 1242    BILITOT 0.3 09/11/2023 1242           Lab Results   Component Value Date    CHOL 255 (H) 09/11/2023    CHOL 257 (H) 08/15/2022    CHOL 247 (H) 01/25/2022     Lab Results   Component Value Date    HDL 50.9 09/11/2023    HDL 55.3 08/15/2022    HDL 53.4 01/25/2022     No results found for: \"LDLCALC\"  Lab Results   Component Value Date    TRIG 694 (H) 09/11/2023    TRIG 624 (H) 08/15/2022    TRIG 426 (H) 01/25/2022     No components found for: \"CHOLHDL\"    Patient Self Assessment of Health Status  Patient Self Assessment: Good    Nutrition and Exercise  Current Diet: Well Balanced Diet  Adequate Fluid Intake: Yes  Caffeine: Yes  Exercise Frequency: Infrequently    Functional Ability/Level of Safety  Cognitive Impairment Observed: No cognitive impairment observed    Home Safety Risk Factors: None    Patient Care Team:  Khalida Zamora DO as PCP - General  Khalida Zamora DO as PCP - [employer] PCP     Review of Systems  ROS as stated in " "HPI Medicare Wellness Billing Compliance Satisfied    *This is a visual tool to show completion of required items on the day of the visit. Green checks will only appear on the date of visit.      Objective   Vitals:  /75   Pulse 95   Temp 36.9 °C (98.5 °F)   Ht 1.549 m (5' 1\")   Wt 82.1 kg (181 lb)   BMI 34.20 kg/m²       Physical Exam  Constitutional: A&O; NAD  HEENT: conjunctiva normal. EOMI; PERRLA; lids normal; TM's clear;   Neck: supple; No lymphadenopathy   Heart: RRR     Lungs: CTA bilateral   Abd: Soft, Nontender, Nondistended  Ext:  No edema;    Neuro: No gross neuro deficits     Psych: Judgment, orientation, mood, affect, insight wnl   Assessment/Plan   Problem List Items Addressed This Visit       Diabetes mellitus, type 2 (CMS/HCC)    Relevant Medications    metFORMIN (Glucophage) 1,000 mg tablet    Insomnia    Relevant Medications    traZODone (Desyrel) 50 mg tablet     Other Visit Diagnoses       Medicare annual wellness visit, subsequent    -  Primary    Breast cancer screening by mammogram        Relevant Orders    BI mammo bilateral screening tomosynthesis (Completed)    Postmenopausal        Relevant Orders    XR DEXA bone density (Completed)         Monitor BP  f/u with specialists  5/2023 mammo-neg; recheck in may  4/2021 DEXA-normal; recheck  Reviewed recent labs- fax to cardio and ask him about repatha  f/u with specialists  5/2021 CT calcium score  2022 EGD/colonoscopy- fu 5 yrs  annual optho  11/2021 urine MA-neg; sees nephro  prevnar 9/2020  pneumovax had  had covid shots   flu shot - had  RSV-defers  shingrix recommended- defers due to cost - checked with her insurance  healthy lifestyle-diet, exercise.  Fu 6 mo or sooner if needed        "

## 2024-03-07 ENCOUNTER — OFFICE VISIT (OUTPATIENT)
Dept: PRIMARY CARE | Facility: CLINIC | Age: 74
End: 2024-03-07
Payer: MEDICARE

## 2024-03-07 ENCOUNTER — HOSPITAL ENCOUNTER (OUTPATIENT)
Dept: RADIOLOGY | Facility: EXTERNAL LOCATION | Age: 74
Discharge: HOME | End: 2024-03-07

## 2024-03-07 VITALS
TEMPERATURE: 98.5 F | WEIGHT: 181 LBS | HEIGHT: 61 IN | SYSTOLIC BLOOD PRESSURE: 125 MMHG | BODY MASS INDEX: 34.17 KG/M2 | HEART RATE: 95 BPM | DIASTOLIC BLOOD PRESSURE: 75 MMHG

## 2024-03-07 DIAGNOSIS — E11.9 TYPE 2 DIABETES MELLITUS WITHOUT COMPLICATION, WITHOUT LONG-TERM CURRENT USE OF INSULIN (MULTI): ICD-10-CM

## 2024-03-07 DIAGNOSIS — Z00.00 MEDICARE ANNUAL WELLNESS VISIT, SUBSEQUENT: Primary | ICD-10-CM

## 2024-03-07 DIAGNOSIS — Z12.31 BREAST CANCER SCREENING BY MAMMOGRAM: ICD-10-CM

## 2024-03-07 DIAGNOSIS — Z78.0 POSTMENOPAUSAL: ICD-10-CM

## 2024-03-07 DIAGNOSIS — G47.00 INSOMNIA, UNSPECIFIED TYPE: ICD-10-CM

## 2024-03-07 PROCEDURE — 1036F TOBACCO NON-USER: CPT | Performed by: FAMILY MEDICINE

## 2024-03-07 PROCEDURE — 1159F MED LIST DOCD IN RCRD: CPT | Performed by: FAMILY MEDICINE

## 2024-03-07 PROCEDURE — 1157F ADVNC CARE PLAN IN RCRD: CPT | Performed by: FAMILY MEDICINE

## 2024-03-07 PROCEDURE — 1158F ADVNC CARE PLAN TLK DOCD: CPT | Performed by: FAMILY MEDICINE

## 2024-03-07 PROCEDURE — 3074F SYST BP LT 130 MM HG: CPT | Performed by: FAMILY MEDICINE

## 2024-03-07 PROCEDURE — 3078F DIAST BP <80 MM HG: CPT | Performed by: FAMILY MEDICINE

## 2024-03-07 PROCEDURE — G0439 PPPS, SUBSEQ VISIT: HCPCS | Performed by: FAMILY MEDICINE

## 2024-03-07 PROCEDURE — 1125F AMNT PAIN NOTED PAIN PRSNT: CPT | Performed by: FAMILY MEDICINE

## 2024-03-07 PROCEDURE — 3008F BODY MASS INDEX DOCD: CPT | Performed by: FAMILY MEDICINE

## 2024-03-07 PROCEDURE — 3044F HG A1C LEVEL LT 7.0%: CPT | Performed by: FAMILY MEDICINE

## 2024-03-07 PROCEDURE — 1170F FXNL STATUS ASSESSED: CPT | Performed by: FAMILY MEDICINE

## 2024-03-07 PROCEDURE — 1123F ACP DISCUSS/DSCN MKR DOCD: CPT | Performed by: FAMILY MEDICINE

## 2024-03-07 PROCEDURE — 1160F RVW MEDS BY RX/DR IN RCRD: CPT | Performed by: FAMILY MEDICINE

## 2024-03-07 RX ORDER — METFORMIN HYDROCHLORIDE 1000 MG/1
1000 TABLET ORAL
Qty: 180 TABLET | Refills: 1 | Status: SHIPPED | OUTPATIENT
Start: 2024-03-07

## 2024-03-07 RX ORDER — TRAZODONE HYDROCHLORIDE 50 MG/1
TABLET ORAL
Qty: 180 TABLET | Refills: 1 | Status: SHIPPED | OUTPATIENT
Start: 2024-03-07

## 2024-03-07 ASSESSMENT — ACTIVITIES OF DAILY LIVING (ADL)
MANAGING_FINANCES: INDEPENDENT
GROCERY_SHOPPING: INDEPENDENT
DOING_HOUSEWORK: INDEPENDENT
BATHING: INDEPENDENT
TAKING_MEDICATION: INDEPENDENT
DRESSING: INDEPENDENT

## 2024-03-07 ASSESSMENT — PATIENT HEALTH QUESTIONNAIRE - PHQ9
SUM OF ALL RESPONSES TO PHQ9 QUESTIONS 1 AND 2: 0
2. FEELING DOWN, DEPRESSED OR HOPELESS: NOT AT ALL
1. LITTLE INTEREST OR PLEASURE IN DOING THINGS: NOT AT ALL

## 2024-03-22 ENCOUNTER — HOSPITAL ENCOUNTER (OUTPATIENT)
Dept: RADIOLOGY | Facility: EXTERNAL LOCATION | Age: 74
Discharge: HOME | End: 2024-03-22

## 2024-03-22 DIAGNOSIS — N28.89 RENAL MASS, LEFT: ICD-10-CM

## 2024-03-29 ENCOUNTER — HOSPITAL ENCOUNTER (OUTPATIENT)
Dept: RADIOLOGY | Facility: EXTERNAL LOCATION | Age: 74
Discharge: HOME | End: 2024-03-29

## 2024-03-29 DIAGNOSIS — N28.89 RENAL MASS, LEFT: Primary | ICD-10-CM

## 2024-03-29 DIAGNOSIS — N28.89 RENAL MASS, LEFT: ICD-10-CM

## 2024-05-03 ENCOUNTER — HOSPITAL ENCOUNTER (OUTPATIENT)
Dept: RADIOLOGY | Facility: EXTERNAL LOCATION | Age: 74
Discharge: HOME | End: 2024-05-03
Payer: MEDICARE

## 2024-05-03 DIAGNOSIS — Z12.31 BREAST CANCER SCREENING BY MAMMOGRAM: ICD-10-CM

## 2024-05-08 DIAGNOSIS — I10 HYPERTENSION, UNSPECIFIED TYPE: ICD-10-CM

## 2024-05-09 RX ORDER — LISINOPRIL AND HYDROCHLOROTHIAZIDE 12.5; 2 MG/1; MG/1
1 TABLET ORAL DAILY
Qty: 90 TABLET | Refills: 3 | Status: SHIPPED | OUTPATIENT
Start: 2024-05-09

## 2024-05-13 ENCOUNTER — TELEPHONE (OUTPATIENT)
Dept: PRIMARY CARE | Facility: CLINIC | Age: 74
End: 2024-05-13
Payer: MEDICARE

## 2024-05-13 NOTE — TELEPHONE ENCOUNTER
----- Message from Khalida Zamora DO sent at 5/9/2024  3:09 PM EDT -----  Please let pt know her bone density test was normal.  I recommend regular walking and weight bearing exercise and getting adequate calcium and Vitamin D  to prevent future bone loss.

## 2024-05-13 NOTE — TELEPHONE ENCOUNTER
----- Message from Khalida Zamora DO sent at 5/9/2024  3:08 PM EDT -----  Let pt know her mammogram was normal.  Repeat in 1 year.

## 2024-06-04 DIAGNOSIS — E03.9 HYPOTHYROIDISM, UNSPECIFIED TYPE: ICD-10-CM

## 2024-06-05 RX ORDER — LEVOTHYROXINE SODIUM 50 UG/1
50 TABLET ORAL
Qty: 90 TABLET | Refills: 3 | Status: SHIPPED | OUTPATIENT
Start: 2024-06-05

## 2024-07-10 DIAGNOSIS — G47.00 INSOMNIA, UNSPECIFIED TYPE: ICD-10-CM

## 2024-07-11 RX ORDER — TRAZODONE HYDROCHLORIDE 50 MG/1
TABLET ORAL
Qty: 180 TABLET | Refills: 3 | Status: SHIPPED | OUTPATIENT
Start: 2024-07-11

## 2024-07-22 DIAGNOSIS — G47.00 INSOMNIA, UNSPECIFIED TYPE: ICD-10-CM

## 2024-07-22 RX ORDER — TRAZODONE HYDROCHLORIDE 50 MG/1
TABLET ORAL
Qty: 180 TABLET | Refills: 3 | Status: SHIPPED | OUTPATIENT
Start: 2024-07-22

## 2024-08-19 ENCOUNTER — OFFICE VISIT (OUTPATIENT)
Dept: PRIMARY CARE | Facility: CLINIC | Age: 74
End: 2024-08-19
Payer: MEDICARE

## 2024-08-19 VITALS
TEMPERATURE: 97.1 F | SYSTOLIC BLOOD PRESSURE: 148 MMHG | BODY MASS INDEX: 34.77 KG/M2 | OXYGEN SATURATION: 93 % | WEIGHT: 184 LBS | HEART RATE: 123 BPM | DIASTOLIC BLOOD PRESSURE: 65 MMHG

## 2024-08-19 DIAGNOSIS — H60.331 ACUTE SWIMMER'S EAR OF RIGHT SIDE: Primary | ICD-10-CM

## 2024-08-19 PROCEDURE — 99213 OFFICE O/P EST LOW 20 MIN: CPT | Performed by: FAMILY MEDICINE

## 2024-08-19 PROCEDURE — 1123F ACP DISCUSS/DSCN MKR DOCD: CPT | Performed by: FAMILY MEDICINE

## 2024-08-19 PROCEDURE — 1157F ADVNC CARE PLAN IN RCRD: CPT | Performed by: FAMILY MEDICINE

## 2024-08-19 PROCEDURE — 1160F RVW MEDS BY RX/DR IN RCRD: CPT | Performed by: FAMILY MEDICINE

## 2024-08-19 PROCEDURE — 3078F DIAST BP <80 MM HG: CPT | Performed by: FAMILY MEDICINE

## 2024-08-19 PROCEDURE — 3077F SYST BP >= 140 MM HG: CPT | Performed by: FAMILY MEDICINE

## 2024-08-19 PROCEDURE — 3044F HG A1C LEVEL LT 7.0%: CPT | Performed by: FAMILY MEDICINE

## 2024-08-19 PROCEDURE — 1159F MED LIST DOCD IN RCRD: CPT | Performed by: FAMILY MEDICINE

## 2024-08-19 PROCEDURE — 1036F TOBACCO NON-USER: CPT | Performed by: FAMILY MEDICINE

## 2024-08-19 RX ORDER — NEOMYCIN SULFATE, POLYMYXIN B SULFATE, HYDROCORTISONE 3.5; 10000; 1 MG/ML; [USP'U]/ML; MG/ML
2 SOLUTION/ DROPS AURICULAR (OTIC) 4 TIMES DAILY
Qty: 4 ML | Refills: 0 | Status: SHIPPED | OUTPATIENT
Start: 2024-08-19 | End: 2024-08-29

## 2024-08-19 RX ORDER — CEPHALEXIN 500 MG/1
500 CAPSULE ORAL 2 TIMES DAILY
Qty: 20 CAPSULE | Refills: 0 | Status: SHIPPED | OUTPATIENT
Start: 2024-08-19 | End: 2024-08-29

## 2024-08-19 ASSESSMENT — PATIENT HEALTH QUESTIONNAIRE - PHQ9
2. FEELING DOWN, DEPRESSED OR HOPELESS: NOT AT ALL
SUM OF ALL RESPONSES TO PHQ9 QUESTIONS 1 AND 2: 0
1. LITTLE INTEREST OR PLEASURE IN DOING THINGS: NOT AT ALL

## 2024-08-19 NOTE — PROGRESS NOTES
Subjective   Patient ID: Justin Roberson is a 73 y.o. female who presents for Follow-up ( visit).  Very pleasant 73-year-old cannot hear out of her right ear it is very painful it has been bothering her for about a week she went swimming and it started shortly after that she went to [facility] she was given moxifloxacin drops but the drops only been going on that right side the left side is completely healed but the right side remains blocked she cannot hear out of it and she lays down and puts the drops in it as soon as she gets up the drops from right out and she does not feel like it is helping she has severely muscle muffled hearing on that right ear on it is very swollen very painful and she feels like electric shocks along the right side of her face no stuffy nose runny nose cough or fever        Review of Systems  Constitutional: no chills, no fever and no night sweats.   Eyes: no blurred vision and no eyesight problems.   ENT: no hearing loss, no nasal congestion, no nasal discharge, no hoarseness and no sore throat.   Cardiovascular: no chest pain, no intermittent leg claudication, no lower extremity edema, no palpitations and no syncope.   Respiratory: no cough, no shortness of breath during exertion, no shortness of breath at rest and no wheezing.   Gastrointestinal: no abdominal pain, no blood in stools, no constipation, no diarrhea, no melena, no nausea, no rectal pain and no vomiting.   Genitourinary: no dysuria, no change in urinary frequency, no urinary hesitancy, no feelings of urinary urgency and no vaginal discharge.   Musculoskeletal: no arthralgias,  no back pain and no myalgias.   Integumentary: no new skin lesions and no rashes.   Neurological: no difficulty walking, no headache, no limb weakness, no numbness and no tingling.   Psychiatric: no anxiety, no depression, no anhedonia and no substance use disorders.   Endocrine: no recent weight gain and no recent weight loss.    Hematologic/Lymphatic: no tendency for easy bruising and no swollen glands .    Objective    /65   Pulse (!) 123   Temp 36.2 °C (97.1 °F)   Wt 83.5 kg (184 lb)   SpO2 93%   BMI 34.77 kg/m²    Physical Exam  The patient appeared well nourished and normally developed. Vital signs as documented. Head exam is unremarkable. No scleral icterus or corneal arcus noted.  Pupils are equal round reactive to light extraocular movements are intact no hemorrhages noted on funduscopic exam mouth mucous membranes are moist no exudates ears canals clear TMs are gray pearly not injected nose no rhinorrhea or epistaxis Neck is without jugular venous distension, thyromegaly, or carotid bruits. Carotid upstrokes are brisk bilaterally. Lungs are clear to auscultation and percussion. Cardiac exam reveals the PMI to be normally sized and situated. Rhythm is regular. First and second heart sounds normal. No murmurs, rubs or gallops. Abdominal exam reveals normal bowel sounds, no masses, no organomegaly and no aortic enlargement. Extremities are nonedematous and both femoral and pedal pulses are normal.  Neurologic exam DTRs are equal bilaterally no focal deficits strength is symmetrical heme lymph no palpable lymph nodes in the neck axilla or groin  Right ear obstructed with debris and wax canal swollen tender pain on retraction of the right ear  Assessment/Plan   Problem List Items Addressed This Visit       Acute swimmer's ear of right side - Primary   Cephalexin 500 mg twice daily  Cortisporin otic 3 times daily  Follow-up if symptoms do not improve  Anticipatory guidance worsening future suggested earplugs or not bedrest         Sybil Hayes MD

## 2024-08-31 PROBLEM — H60.331 ACUTE SWIMMER'S EAR OF RIGHT SIDE: Status: ACTIVE | Noted: 2024-08-31

## 2024-09-17 NOTE — PROGRESS NOTES
Subjective   Patient ID: Justin Roberson is a 73 y.o. female who presents for Follow-up (She is fasting for blood work.  Flu shot will get in Oct. ).    HPI   73-year-old female presents for  f/u     Hx of hospitalization 11/27/22-11/30/22 for covid/ bilateral subsegmental PE  US of legs was neg; was +covid  seeing cardio-dr contreras.   Off eliquis    on estradiol- rx'd by gyn dr wagner- pt reports that her heme and gyn said it was ok to cont the estradiol  no hx of prior DVT/PE   sees heme next mo for fu /labs  On oral iron    was then readmitted 12/12/22 with bright red blood per rectum-   saw GI dr murillo   12/15/22 EGD +gastritis; recommend protonix but she never started due to cost. had f/u with GI;  no symptoms so not taking any PPI  12/15/22 colonoscopy +polyp/hemorrhoids/diverticulosis; f/u 5 yrs.   no further blood in stool.   no reflex  bowels are reg     no further chest pains. no palps, sob     had CT calcium score in 5/2021 + 65   Hx hypertriglyceridemai/AR/AS  tried tricor and zetia but couldn't tolerate; cant tolerate statins or lovaza  Saw cardio recently and had lipids in aug and started on repatha- has has some leg cramps    5/2024 mammo-neg; no breast changes     hx hypothyrodisim- on levothyroxine 50mcg    no swelling     hx DM2- diagnosed over 20 yrs ago  on metformin 1000mg BID  sees optho- wears glasses  has been well controlled      hx of HTN- on lisinopril/hctz  tries healthy diet     5/2024 DEXA- normal   Takes a MVI     Shingrix- not yet; insurance wont cover per pt?  prevnar 9/2020  Pneumovax- had  Flu shot- will get this fall  covid- had   RSV- not yet- defers     She denies any chest pains, palpitations, edema, shortness of breath, abdominal pains, reflux, nausea, vomiting, diarrhea, constipation, blood in stool, melena.   hx of murmur/AR/AS      hx CRI/renal masses   nephro dr aparicio and urologist- dr bailey monitoring with MRIs      Sees optho  Sees dentist  No mole  "changes    Review of Systems  ROS as stated in HPI    Objective   /82   Pulse 100   Temp 37.1 °C (98.7 °F)   Ht 1.549 m (5' 1\")   Wt 84.9 kg (187 lb 3.2 oz)   BMI 35.37 kg/m²     Physical Exam  Constitutional: A&O; NAD  HEENT: conjunctiva normal. EOMI; PERRLA; lids normal; TM's clear;   Neck: supple; No lymphadenopathy   Heart: RRR     Lungs: CTA bilateral   Abd: Soft, Nontender, Nondistended  Ext:  No edema;    Neuro: No gross neuro deficits     Psych: Judgment, orientation, mood, affect, insight wnl   Assessment/Plan   Problem List Items Addressed This Visit             ICD-10-CM    CKD stage G3a/A1, GFR 45-59 and albumin creatinine ratio <30 mg/g (Multi) N18.31    Diabetes mellitus, type 2 (Multi) E11.9    Relevant Orders    Comprehensive Metabolic Panel    Hemoglobin A1C    HLD (hyperlipidemia) E78.5    Relevant Orders    Comprehensive Metabolic Panel    Hypertension - Primary I10    Relevant Orders    Comprehensive Metabolic Panel    Hypothyroidism E03.9    Relevant Orders    Thyroid Stimulating Hormone    T4, free    Type 2 diabetes mellitus with hypertriglyceridemia (Multi) E11.69, E78.1     Monitor BP  f/u with specialists  5/2024 mammo-neg;   5/2024 DEXA-normal;   8/2024 lipids per cardio- started on repatha  Check labs  f/u with specialists- sees heme next mo for fu   5/2021 CT calcium score  2022 EGD/colonoscopy- fu 5 yrs  annual optho  11/2021 urine MA-neg; sees nephro  prevnar 9/2020  pneumovax had  had covid shots   flu shot - will get in fall   RSV-defers  shingrix recommended- defers due to cost - checked with her insurance  healthy lifestyle-diet, exercise.  Fu 6 mo for MWV or sooner if needed      "

## 2024-09-19 ENCOUNTER — APPOINTMENT (OUTPATIENT)
Dept: PRIMARY CARE | Facility: CLINIC | Age: 74
End: 2024-09-19
Payer: MEDICARE

## 2024-09-19 ENCOUNTER — TELEPHONE (OUTPATIENT)
Dept: PRIMARY CARE | Facility: CLINIC | Age: 74
End: 2024-09-19

## 2024-09-19 VITALS
DIASTOLIC BLOOD PRESSURE: 82 MMHG | HEIGHT: 61 IN | BODY MASS INDEX: 35.34 KG/M2 | HEART RATE: 100 BPM | SYSTOLIC BLOOD PRESSURE: 110 MMHG | WEIGHT: 187.2 LBS | TEMPERATURE: 98.7 F

## 2024-09-19 DIAGNOSIS — N18.31 CKD STAGE G3A/A1, GFR 45-59 AND ALBUMIN CREATININE RATIO <30 MG/G (MULTI): ICD-10-CM

## 2024-09-19 DIAGNOSIS — I10 HYPERTENSION, UNSPECIFIED TYPE: Primary | ICD-10-CM

## 2024-09-19 DIAGNOSIS — E78.1 TYPE 2 DIABETES MELLITUS WITH HYPERTRIGLYCERIDEMIA (MULTI): ICD-10-CM

## 2024-09-19 DIAGNOSIS — Z23 ENCOUNTER FOR IMMUNIZATION: ICD-10-CM

## 2024-09-19 DIAGNOSIS — E78.5 HYPERLIPIDEMIA, UNSPECIFIED HYPERLIPIDEMIA TYPE: ICD-10-CM

## 2024-09-19 DIAGNOSIS — E03.9 HYPOTHYROIDISM, UNSPECIFIED TYPE: ICD-10-CM

## 2024-09-19 DIAGNOSIS — E11.9 TYPE 2 DIABETES MELLITUS WITHOUT COMPLICATION, WITHOUT LONG-TERM CURRENT USE OF INSULIN (MULTI): ICD-10-CM

## 2024-09-19 DIAGNOSIS — E11.69 TYPE 2 DIABETES MELLITUS WITH HYPERTRIGLYCERIDEMIA (MULTI): ICD-10-CM

## 2024-09-19 PROBLEM — E66.01 CLASS 2 SEVERE OBESITY WITH SERIOUS COMORBIDITY AND BODY MASS INDEX (BMI) OF 36.0 TO 36.9 IN ADULT: Status: RESOLVED | Noted: 2023-02-02 | Resolved: 2024-09-19

## 2024-09-19 PROBLEM — E66.812 CLASS 2 SEVERE OBESITY WITH SERIOUS COMORBIDITY AND BODY MASS INDEX (BMI) OF 36.0 TO 36.9 IN ADULT: Status: RESOLVED | Noted: 2023-02-02 | Resolved: 2024-09-19

## 2024-09-19 PROBLEM — I26.99 PULMONARY EMBOLISM: Status: RESOLVED | Noted: 2023-02-02 | Resolved: 2024-09-19

## 2024-09-19 PROBLEM — I26.99 PULMONARY EMBOLISM, BILATERAL (MULTI): Status: RESOLVED | Noted: 2023-02-02 | Resolved: 2024-09-19

## 2024-09-19 LAB
NON-UH HIE A/G RATIO: 1
NON-UH HIE ALB: 3.6 G/DL (ref 3.4–5)
NON-UH HIE ALK PHOS: 81 UNIT/L (ref 45–117)
NON-UH HIE BILIRUBIN, TOTAL: 0.3 MG/DL (ref 0.3–1.2)
NON-UH HIE BUN/CREAT RATIO: 14
NON-UH HIE BUN: 21 MG/DL (ref 9–23)
NON-UH HIE CALCIUM: 9.7 MG/DL (ref 8.7–10.4)
NON-UH HIE CALCULATED OSMOLALITY: 284 MOSM/KG (ref 275–295)
NON-UH HIE CHLORIDE: 103 MMOL/L (ref 98–107)
NON-UH HIE CO2, VENOUS: 25 MMOL/L (ref 20–31)
NON-UH HIE CREATININE: 1.5 MG/DL (ref 0.5–0.8)
NON-UH HIE FREE T4: 1.27 NG/DL (ref 0.89–1.76)
NON-UH HIE GFR AA: 41
NON-UH HIE GLOBULIN: 3.6 G/DL
NON-UH HIE GLOMERULAR FILTRATION RATE: 34 ML/MIN/1.73M?
NON-UH HIE GLUCOSE: 122 MG/DL (ref 74–106)
NON-UH HIE GOT: 30 UNIT/L (ref 15–37)
NON-UH HIE GPT: 22 UNIT/L (ref 10–49)
NON-UH HIE HGB A1C: 5.7 %
NON-UH HIE K: 4.6 MMOL/L (ref 3.5–5.1)
NON-UH HIE NA: 140 MMOL/L (ref 135–145)
NON-UH HIE TOTAL PROTEIN: 7.2 G/DL (ref 5.7–8.2)
NON-UH HIE TSH: 3.02 UIU/ML (ref 0.55–4.78)

## 2024-09-19 PROCEDURE — 3074F SYST BP LT 130 MM HG: CPT | Performed by: FAMILY MEDICINE

## 2024-09-19 PROCEDURE — 99214 OFFICE O/P EST MOD 30 MIN: CPT | Performed by: FAMILY MEDICINE

## 2024-09-19 PROCEDURE — 1157F ADVNC CARE PLAN IN RCRD: CPT | Performed by: FAMILY MEDICINE

## 2024-09-19 PROCEDURE — 1036F TOBACCO NON-USER: CPT | Performed by: FAMILY MEDICINE

## 2024-09-19 PROCEDURE — 3044F HG A1C LEVEL LT 7.0%: CPT | Performed by: FAMILY MEDICINE

## 2024-09-19 PROCEDURE — 1159F MED LIST DOCD IN RCRD: CPT | Performed by: FAMILY MEDICINE

## 2024-09-19 PROCEDURE — 3008F BODY MASS INDEX DOCD: CPT | Performed by: FAMILY MEDICINE

## 2024-09-19 PROCEDURE — 3079F DIAST BP 80-89 MM HG: CPT | Performed by: FAMILY MEDICINE

## 2024-09-19 PROCEDURE — 1160F RVW MEDS BY RX/DR IN RCRD: CPT | Performed by: FAMILY MEDICINE

## 2024-09-19 PROCEDURE — 1123F ACP DISCUSS/DSCN MKR DOCD: CPT | Performed by: FAMILY MEDICINE

## 2024-09-19 RX ORDER — EVOLOCUMAB 140 MG/ML
140 INJECTION, SOLUTION SUBCUTANEOUS
COMMUNITY
Start: 2024-08-01

## 2024-09-20 NOTE — TELEPHONE ENCOUNTER
----- Message from Khalida Zamora sent at 9/20/2024  8:17 AM EDT -----  Please let pt know that her electrolytes, thyroid, liver, and kidney function are all normal.  Her sugar was elevated at 122 and their HbA1c, the 3 mo avg of sugars was 5.7, which indicates good control of her diabetes.  I recommend a healthy diet and exercise and follow up in a few months to recheck.   Her kidney function was elevated at 1.5 (should be less than 0.8).  I recommend she fu with her nephrologist, Dr Patten.  Please fax labs to him.

## 2024-10-02 DIAGNOSIS — E11.9 TYPE 2 DIABETES MELLITUS WITHOUT COMPLICATION, WITHOUT LONG-TERM CURRENT USE OF INSULIN (MULTI): ICD-10-CM

## 2024-10-02 RX ORDER — METFORMIN HYDROCHLORIDE 1000 MG/1
1000 TABLET ORAL
Qty: 180 TABLET | Refills: 3 | Status: SHIPPED | OUTPATIENT
Start: 2024-10-02

## 2024-10-17 DIAGNOSIS — G47.00 INSOMNIA, UNSPECIFIED TYPE: ICD-10-CM

## 2024-10-17 RX ORDER — TRAZODONE HYDROCHLORIDE 50 MG/1
TABLET ORAL
Qty: 180 TABLET | Refills: 3 | Status: SHIPPED | OUTPATIENT
Start: 2024-10-17

## 2024-10-17 NOTE — TELEPHONE ENCOUNTER
Pt called and wanted   traZODone (Desyrel) 50 mg tablet    sent to:      [facility] - [place], [place] - [address]  [address]  [facility] [address]  [facility] [address]  Phone: [phone] Fax: [phone]

## 2024-10-21 DIAGNOSIS — G47.00 INSOMNIA, UNSPECIFIED TYPE: ICD-10-CM

## 2024-10-21 RX ORDER — TRAZODONE HYDROCHLORIDE 50 MG/1
TABLET ORAL
Qty: 90 TABLET | Refills: 1 | OUTPATIENT
Start: 2024-10-21

## 2024-10-25 ENCOUNTER — TELEPHONE (OUTPATIENT)
Dept: PRIMARY CARE | Facility: CLINIC | Age: 74
End: 2024-10-25
Payer: MEDICARE

## 2024-10-25 DIAGNOSIS — Z23 ENCOUNTER FOR IMMUNIZATION: ICD-10-CM

## 2024-10-31 ENCOUNTER — APPOINTMENT (OUTPATIENT)
Dept: PRIMARY CARE | Facility: CLINIC | Age: 74
End: 2024-10-31
Payer: MEDICARE

## 2024-10-31 DIAGNOSIS — Z23 ENCOUNTER FOR IMMUNIZATION: ICD-10-CM

## 2024-10-31 PROCEDURE — G0008 ADMIN INFLUENZA VIRUS VAC: HCPCS | Performed by: FAMILY MEDICINE

## 2024-10-31 PROCEDURE — 90662 IIV NO PRSV INCREASED AG IM: CPT | Performed by: FAMILY MEDICINE

## 2025-02-06 LAB
NON-UH HIE CALCULATED LDL CHOLESTEROL: ABNORMAL MG/DL (ref 60–130)
NON-UH HIE CHOLESTEROL: 150 MG/DL (ref 100–200)
NON-UH HIE HDL CHOLESTEROL: 62 MG/DL (ref 40–60)
NON-UH HIE TOTAL CHOL/HDL CHOL RATIO: 2.4
NON-UH HIE TRIGLYCERIDES: 416 MG/DL (ref 30–150)

## 2025-03-03 DIAGNOSIS — N28.89 RENAL MASS, LEFT: ICD-10-CM

## 2025-03-19 NOTE — PROGRESS NOTES
Subjective   Reason for Visit: Justin Roberson is an 74 y.o. female here for a Medicare Wellness visit.     Past Medical, Surgical, and Family History reviewed and updated in chart.    Reviewed all medications by prescribing practitioner or clinical pharmacist (such as prescriptions, OTCs, herbal therapies and supplements) and documented in the medical record.    HPI    74-year-old female presents for MWV and  f/u   co 4-5 wk hx of left 4th finger pain and swelling and now burning/tingling in 4th and 5th fingers.  NKI  no neck, arm, elbow pains or paresthesia  painful to bend her finger  left 4th and 5th  finger      Hx of hospitalization 11/27/22-11/30/22 for covid/ bilateral subsegmental PE  US of legs was neg; was +covid  seeing cardio-dr contreras.   Off eliquis    on estradiol- rx'd by gyn dr wagner- pt reports that her heme and gyn said it was ok to cont the estradiol  no hx of prior DVT/PE   sees heme next mo for fu /labs    was then readmitted 12/12/22 with bright red blood per rectum-   saw GI dr murillo   12/15/22 EGD +gastritis; recommend protonix but she never started due to cost. had f/u with GI;  no symptoms so not taking any PPI  12/15/22 colonoscopy +polyp/hemorrhoids/diverticulosis; f/u 5 yrs.   no further blood in stool.   no reflex  bowels are reg     no further chest pains. no palps, sob     had CT calcium score in 5/2021 + 65   Hx hypertriglyceridemai/AR/AS  tried tricor and zetia but couldn't tolerate; cant tolerate statins or lovaza  now on repatha  recent fu lipids wtih cardio TG better but still up    5/2024 mammo-neg; no breast changes   has order from gyn kristy    hx hypothyrodisim- on levothyroxine 50mcg     hx DM2- diagnosed over 20 yrs ago  on metformin 1000mg BID  sees optho- wears glasses  has been well controlled      hx of HTN- on lisinopril/hctz BID  tries healthy diet   doesnt check BP    5/2024 DEXA- normal   Takes a MVI      Shingrix- not yet; insurance wont cover per  "pt  prevnar 9/2020  Pneumovax- had  Flu shot- had  covid- had   RSV- not yet- defers     She denies any chest pains, palpitations, edema, shortness of breath, abdominal pains, reflux, nausea, vomiting, diarrhea, constipation, blood in stool, melena.   hx of murmur/AR/AS cardio monitors echos     hx CRI/renal masses   nephro dr aparicio and urologist- dr bailey monitoring with MRIs - had yesterday     Sees optho  Sees dentist  No mole changes  doesnt see derm for skin checks.  /70   Pulse 97   Temp 36.7 °C (98.1 °F)   Ht 1.549 m (5' 1\")   Wt 85.2 kg (187 lb 12.8 oz)   BMI 35.48 kg/m²   Patient Self Assessment of Health Status  Patient Self Assessment: Good    Nutrition and Exercise  Current Diet: Well Balanced Diet  Adequate Fluid Intake: Yes  Caffeine: Yes  Exercise Frequency: Regularly    Functional Ability/Level of Safety  Cognitive Impairment Observed: No cognitive impairment observed    Home Safety Risk Factors: None    Patient Care Team:  Khalida Zamora DO as PCP - General     Review of Systems  ROS as stated in HPI    Medicare Wellness Billing Compliance Satisfied    *This is a visual tool to show completion of required items on the day of the visit. Green checks will only appear on the date of visit.      Objective   Vitals:  /70   Pulse 97   Temp 36.7 °C (98.1 °F)   Ht 1.549 m (5' 1\")   Wt 85.2 kg (187 lb 12.8 oz)   BMI 35.48 kg/m²       Physical Exam  Constitutional: A&O; NAD  HEENT: conjunctiva normal. EOMI; PERRLA; lids normal; TM's clear;   Neck: supple; No lymphadenopathy   Heart: RRR     Lungs: CTA bilateral   Abd: Soft, Nontender, Nondistended  Ext:  No edema;    Neuro: No gross neuro deficits     Psych: Judgment, orientation, mood, affect, insight wnl   Assessment/Plan   Problem List Items Addressed This Visit       CKD stage G3a/A1, GFR 45-59 and albumin creatinine ratio <30 mg/g (Multi)    Overview     01/05/2023   Khalida Zamora  Stable based on last GFR.  Continue " established treatment plan including avoidance of nephrotoxins and follow up at least yearly         Diabetes mellitus, type 2 (Multi)    Relevant Medications    metFORMIN (Glucophage) 1,000 mg tablet    Other Relevant Orders    Hemoglobin A1C    Hypertension    Relevant Medications    lisinopriL-hydrochlorothiazide 20-12.5 mg tablet    Hypothyroidism    Relevant Medications    levothyroxine (Synthroid, Levoxyl) 50 mcg tablet    Other Relevant Orders    Thyroid Stimulating Hormone    T4, free    Insomnia    Relevant Medications    traZODone (Desyrel) 50 mg tablet    Type 2 diabetes mellitus with hypertriglyceridemia (Multi)    Overview     1/5/2023  Khalida Zamora  Stable diaetes mellitus with complications based on lab values and symptoms.  Continue established treatment plan including control of risk factors.  Follow up at least yearly           Obesity, morbid (Multi)     Other Visit Diagnoses       Medicare annual wellness visit, subsequent    -  Primary    Finger pain, left        Relevant Orders    XR fingers left 2+ views    Referral to Orthopedics and Sports Medicine    Breast cancer screening by mammogram                Monitor BP  f/u with specialists  5/2024 mammo-neg; check in may- has order from gyn  5/2024 DEXA-normal;    lipids per cardio- on repatha  Check labs  f/u with specialists- sees heme   5/2021 CT calcium score  2022 EGD/colonoscopy- fu 5 yrs  annual optho  11/2021 urine MA-neg; sees nephro  prevnar 9/2020  pneumovax had  had covid shots   flu shot - had  RSV-defers  shingrix recommended- defers due to cost - checked with her insurance  healthy lifestyle-diet, exercise.  Fu 6 mo or sooner if needed   check xray of left fingers; consider EMG  refer to ortho

## 2025-03-20 ENCOUNTER — APPOINTMENT (OUTPATIENT)
Dept: PRIMARY CARE | Facility: CLINIC | Age: 75
End: 2025-03-20
Payer: MEDICARE

## 2025-03-20 VITALS
SYSTOLIC BLOOD PRESSURE: 120 MMHG | WEIGHT: 187.8 LBS | HEART RATE: 97 BPM | HEIGHT: 61 IN | TEMPERATURE: 98.1 F | BODY MASS INDEX: 35.45 KG/M2 | DIASTOLIC BLOOD PRESSURE: 70 MMHG

## 2025-03-20 DIAGNOSIS — E11.9 TYPE 2 DIABETES MELLITUS WITHOUT COMPLICATION, WITHOUT LONG-TERM CURRENT USE OF INSULIN (MULTI): ICD-10-CM

## 2025-03-20 DIAGNOSIS — E78.1 TYPE 2 DIABETES MELLITUS WITH HYPERTRIGLYCERIDEMIA (MULTI): ICD-10-CM

## 2025-03-20 DIAGNOSIS — Z12.31 BREAST CANCER SCREENING BY MAMMOGRAM: ICD-10-CM

## 2025-03-20 DIAGNOSIS — Z00.00 MEDICARE ANNUAL WELLNESS VISIT, SUBSEQUENT: Primary | ICD-10-CM

## 2025-03-20 DIAGNOSIS — G47.00 INSOMNIA, UNSPECIFIED TYPE: ICD-10-CM

## 2025-03-20 DIAGNOSIS — E66.01 OBESITY, MORBID (MULTI): ICD-10-CM

## 2025-03-20 DIAGNOSIS — I10 HYPERTENSION, UNSPECIFIED TYPE: ICD-10-CM

## 2025-03-20 DIAGNOSIS — E11.69 TYPE 2 DIABETES MELLITUS WITH HYPERTRIGLYCERIDEMIA (MULTI): ICD-10-CM

## 2025-03-20 DIAGNOSIS — M79.645 FINGER PAIN, LEFT: ICD-10-CM

## 2025-03-20 DIAGNOSIS — E03.9 HYPOTHYROIDISM, UNSPECIFIED TYPE: ICD-10-CM

## 2025-03-20 DIAGNOSIS — N18.31 CKD STAGE G3A/A1, GFR 45-59 AND ALBUMIN CREATININE RATIO <30 MG/G (MULTI): ICD-10-CM

## 2025-03-20 PROCEDURE — 1170F FXNL STATUS ASSESSED: CPT | Performed by: FAMILY MEDICINE

## 2025-03-20 PROCEDURE — 1160F RVW MEDS BY RX/DR IN RCRD: CPT | Performed by: FAMILY MEDICINE

## 2025-03-20 PROCEDURE — 1158F ADVNC CARE PLAN TLK DOCD: CPT | Performed by: FAMILY MEDICINE

## 2025-03-20 PROCEDURE — 1157F ADVNC CARE PLAN IN RCRD: CPT | Performed by: FAMILY MEDICINE

## 2025-03-20 PROCEDURE — 1159F MED LIST DOCD IN RCRD: CPT | Performed by: FAMILY MEDICINE

## 2025-03-20 PROCEDURE — 1123F ACP DISCUSS/DSCN MKR DOCD: CPT | Performed by: FAMILY MEDICINE

## 2025-03-20 PROCEDURE — G0439 PPPS, SUBSEQ VISIT: HCPCS | Performed by: FAMILY MEDICINE

## 2025-03-20 PROCEDURE — 3078F DIAST BP <80 MM HG: CPT | Performed by: FAMILY MEDICINE

## 2025-03-20 PROCEDURE — 3074F SYST BP LT 130 MM HG: CPT | Performed by: FAMILY MEDICINE

## 2025-03-20 PROCEDURE — 3008F BODY MASS INDEX DOCD: CPT | Performed by: FAMILY MEDICINE

## 2025-03-20 PROCEDURE — 1036F TOBACCO NON-USER: CPT | Performed by: FAMILY MEDICINE

## 2025-03-20 RX ORDER — LISINOPRIL AND HYDROCHLOROTHIAZIDE 12.5; 2 MG/1; MG/1
1 TABLET ORAL 2 TIMES DAILY
Qty: 180 TABLET | Refills: 3 | Status: SHIPPED | OUTPATIENT
Start: 2025-03-20

## 2025-03-20 RX ORDER — LEVOTHYROXINE SODIUM 50 UG/1
50 TABLET ORAL
Qty: 90 TABLET | Refills: 3 | Status: SHIPPED | OUTPATIENT
Start: 2025-03-20

## 2025-03-20 RX ORDER — METFORMIN HYDROCHLORIDE 1000 MG/1
1000 TABLET ORAL
Qty: 180 TABLET | Refills: 3 | Status: SHIPPED | OUTPATIENT
Start: 2025-03-20

## 2025-03-20 RX ORDER — TRAZODONE HYDROCHLORIDE 50 MG/1
TABLET ORAL
Qty: 180 TABLET | Refills: 3 | Status: SHIPPED | OUTPATIENT
Start: 2025-03-20

## 2025-03-20 ASSESSMENT — PATIENT HEALTH QUESTIONNAIRE - PHQ9
SUM OF ALL RESPONSES TO PHQ9 QUESTIONS 1 AND 2: 0
1. LITTLE INTEREST OR PLEASURE IN DOING THINGS: NOT AT ALL
2. FEELING DOWN, DEPRESSED OR HOPELESS: NOT AT ALL

## 2025-03-20 ASSESSMENT — ACTIVITIES OF DAILY LIVING (ADL)
GROCERY_SHOPPING: INDEPENDENT
MANAGING_FINANCES: INDEPENDENT
DRESSING: INDEPENDENT
DOING_HOUSEWORK: INDEPENDENT
BATHING: INDEPENDENT
TAKING_MEDICATION: INDEPENDENT

## 2025-03-21 ENCOUNTER — TELEPHONE (OUTPATIENT)
Dept: PRIMARY CARE | Facility: CLINIC | Age: 75
End: 2025-03-21
Payer: MEDICARE

## 2025-03-21 LAB
EST. AVERAGE GLUCOSE BLD GHB EST-MCNC: 140 MG/DL
EST. AVERAGE GLUCOSE BLD GHB EST-SCNC: 7.7 MMOL/L
HBA1C MFR BLD: 6.5 % OF TOTAL HGB
T4 FREE SERPL-MCNC: 1.2 NG/DL (ref 0.8–1.8)
TSH SERPL-ACNC: 1.93 MIU/L (ref 0.4–4.5)

## 2025-03-21 NOTE — TELEPHONE ENCOUNTER
----- Message from Khalida Sera sent at 3/21/2025  8:50 AM EDT -----  Please let pt know her thyroid levels are in normal range so continue current dose of her levothyroxine.   Her HbA1c, the 3 mo avg of their sugars was 6.5, which indicates relatively good control of her diabetes.  I recommend a healthy diet and exercise and we will continue to monitor.

## 2025-03-24 ENCOUNTER — OFFICE VISIT (OUTPATIENT)
Dept: ORTHOPEDIC SURGERY | Facility: CLINIC | Age: 75
End: 2025-03-24
Payer: MEDICARE

## 2025-03-24 ENCOUNTER — HOSPITAL ENCOUNTER (OUTPATIENT)
Dept: RADIOLOGY | Facility: CLINIC | Age: 75
Discharge: HOME | End: 2025-03-24
Payer: MEDICARE

## 2025-03-24 VITALS — BODY MASS INDEX: 35.3 KG/M2 | HEIGHT: 61 IN | WEIGHT: 187 LBS

## 2025-03-24 DIAGNOSIS — G56.22 CUBITAL TUNNEL SYNDROME ON LEFT: Primary | ICD-10-CM

## 2025-03-24 DIAGNOSIS — M79.645 FINGER PAIN, LEFT: ICD-10-CM

## 2025-03-24 PROCEDURE — 1157F ADVNC CARE PLAN IN RCRD: CPT | Performed by: ORTHOPAEDIC SURGERY

## 2025-03-24 PROCEDURE — 99213 OFFICE O/P EST LOW 20 MIN: CPT | Performed by: ORTHOPAEDIC SURGERY

## 2025-03-24 PROCEDURE — 73140 X-RAY EXAM OF FINGER(S): CPT | Mod: LEFT SIDE | Performed by: RADIOLOGY

## 2025-03-24 PROCEDURE — 1160F RVW MEDS BY RX/DR IN RCRD: CPT | Performed by: ORTHOPAEDIC SURGERY

## 2025-03-24 PROCEDURE — 1123F ACP DISCUSS/DSCN MKR DOCD: CPT | Performed by: ORTHOPAEDIC SURGERY

## 2025-03-24 PROCEDURE — 3008F BODY MASS INDEX DOCD: CPT | Performed by: ORTHOPAEDIC SURGERY

## 2025-03-24 PROCEDURE — 1036F TOBACCO NON-USER: CPT | Performed by: ORTHOPAEDIC SURGERY

## 2025-03-24 PROCEDURE — 1159F MED LIST DOCD IN RCRD: CPT | Performed by: ORTHOPAEDIC SURGERY

## 2025-03-24 PROCEDURE — 99203 OFFICE O/P NEW LOW 30 MIN: CPT | Performed by: ORTHOPAEDIC SURGERY

## 2025-03-24 PROCEDURE — 73140 X-RAY EXAM OF FINGER(S): CPT | Mod: LT

## 2025-03-24 NOTE — PROGRESS NOTES
Subjective    Patient ID: Justin Roberson is a 74 y.o. female.    Chief Complaint: No chief complaint on file.     Last Surgery: No surgery found  Last Surgery Date: No surgery found    HPI  The patient is a 74-year-old right-hand-dominant female who comes with a complaint of paresthesias and pain in her left ring and little fingers.  This been going on for approximately 5 weeks.  She is a diabetic but has no complaints of diabetic neuropathy elsewhere.  She denies any trauma to her left upper extremity.    Objective   Ortho Exam  The patient is in no acute distress.  Exam of her left upper extremity reveals her skin envelope is intact.  There is no thenar intrinsic atrophy.  She has a negative Wartenberg's sign.  She has a negative Tinel's at the carpal tunnel.  She does have a mildly positive Tinel's at the cubital tunnel.  There is no evidence of ulnar nerve subluxation.  She had a negative carpal tunnel compression test and a negative Phalen test.    Assessment/Plan   Encounter Diagnoses:  Cubital tunnel syndrome on left    Orders Placed This Encounter    EMG & nerve conduction     Patient has what may be ulnar neuropathy at her left cubital tunnel.  I would the patient undergo a left upper extremity EMG.  She will follow-up after the EMG is performed.

## 2025-03-25 ENCOUNTER — TELEPHONE (OUTPATIENT)
Dept: PRIMARY CARE | Facility: CLINIC | Age: 75
End: 2025-03-25
Payer: MEDICARE

## 2025-03-25 NOTE — TELEPHONE ENCOUNTER
----- Message from Khalida Zamora sent at 3/25/2025 12:58 PM EDT -----  Please let patient know the x-ray of her finger showed degenerative osteoarthritic changes.  I recommend she follow-up with orthopedics as discussed

## 2025-03-28 ENCOUNTER — APPOINTMENT (OUTPATIENT)
Dept: UROLOGY | Facility: CLINIC | Age: 75
End: 2025-03-28
Payer: MEDICARE

## 2025-03-28 VITALS — SYSTOLIC BLOOD PRESSURE: 143 MMHG | HEART RATE: 87 BPM | DIASTOLIC BLOOD PRESSURE: 82 MMHG

## 2025-03-28 DIAGNOSIS — N28.89 RENAL MASS, LEFT: Primary | ICD-10-CM

## 2025-03-28 PROCEDURE — 3077F SYST BP >= 140 MM HG: CPT | Performed by: STUDENT IN AN ORGANIZED HEALTH CARE EDUCATION/TRAINING PROGRAM

## 2025-03-28 PROCEDURE — 1157F ADVNC CARE PLAN IN RCRD: CPT | Performed by: STUDENT IN AN ORGANIZED HEALTH CARE EDUCATION/TRAINING PROGRAM

## 2025-03-28 PROCEDURE — 1159F MED LIST DOCD IN RCRD: CPT | Performed by: STUDENT IN AN ORGANIZED HEALTH CARE EDUCATION/TRAINING PROGRAM

## 2025-03-28 PROCEDURE — 1123F ACP DISCUSS/DSCN MKR DOCD: CPT | Performed by: STUDENT IN AN ORGANIZED HEALTH CARE EDUCATION/TRAINING PROGRAM

## 2025-03-28 PROCEDURE — G2211 COMPLEX E/M VISIT ADD ON: HCPCS | Performed by: STUDENT IN AN ORGANIZED HEALTH CARE EDUCATION/TRAINING PROGRAM

## 2025-03-28 PROCEDURE — 3079F DIAST BP 80-89 MM HG: CPT | Performed by: STUDENT IN AN ORGANIZED HEALTH CARE EDUCATION/TRAINING PROGRAM

## 2025-03-28 PROCEDURE — 99213 OFFICE O/P EST LOW 20 MIN: CPT | Performed by: STUDENT IN AN ORGANIZED HEALTH CARE EDUCATION/TRAINING PROGRAM

## 2025-03-28 NOTE — ASSESSMENT & PLAN NOTE
74 y.o. female with two small L renal masses, one potential AML and another potential papillary RCC.      She has an MRI done in surveillance at [facility].  I can see the images.  The images are poor quality.  I do not have the report.  I will request the report and review prior to follow-up plan.    PLAN:  FUV in a couple of weeks to discussed her imaging once it is interpreted.

## 2025-03-28 NOTE — PROGRESS NOTES
Subjective    Justin Roberson is a 74 y.o. female with two small L renal masses, one potential AML and another potential papillary RCC, who presents for 1 FUV. Kidney MRI was not completed.     She denies gross hematuria. She is doing well. She states she must be put under anaesthesia to have MRI done as she is claustrophobic.      Repeat MRI was done at [facility] which makes it difficult for me to see the report. My personal review shows two growths on the posterior aspect of the kidney. One measures 2.4cm x 2.2cm. The other measures 1.7cm x 1.8cm.      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 Posterior mass is smaller than 2cm but concerning for papillary RCC.      Her renal function is EFGR of 25 and creatinine is 1.5.     Review of Systems    All systems were reviewed. Anything negative was noted in the HPI.    Objective   Physical Exam  Gen: No acute distress      Psych: Alert and oriented x3      Neuro:  Normal ROM     Resp: Nonlabored respirations      CV: Regular rate and rhythm      Abd: S, NT, ND.     : Deferred     Skin: Warm, dry and intact without rashes      Lymphatics: No peripheral edema           No past medical history on file.      Past Surgical History:   Procedure Laterality Date    OTHER SURGICAL HISTORY  01/30/2020    Hysterectomy         Assessment & Plan  Renal mass, left  74 y.o. female with two small L renal masses, one potential AML and another potential papillary RCC.      She has an MRI done in surveillance at [facility].  I can see the images.  The images are poor quality.  I do not have the report.  I will request the report and review prior to follow-up plan.    PLAN:  FUV in a couple of weeks to discussed her imaging once it is interpreted.                                     Scribe Attestation  By signing my name below, I, Sheila Royal   attest that this documentation has been prepared under the direction and in the presence of Leonel Erickson MD MPH

## 2025-04-11 ENCOUNTER — ANCILLARY PROCEDURE (OUTPATIENT)
Dept: URGENT CARE | Age: 75
End: 2025-04-11
Payer: MEDICARE

## 2025-04-11 ENCOUNTER — OFFICE VISIT (OUTPATIENT)
Dept: URGENT CARE | Age: 75
End: 2025-04-11
Payer: MEDICARE

## 2025-04-11 VITALS
TEMPERATURE: 98.4 F | HEIGHT: 60 IN | HEART RATE: 107 BPM | DIASTOLIC BLOOD PRESSURE: 82 MMHG | RESPIRATION RATE: 18 BRPM | BODY MASS INDEX: 36.32 KG/M2 | SYSTOLIC BLOOD PRESSURE: 139 MMHG | WEIGHT: 185 LBS | OXYGEN SATURATION: 96 %

## 2025-04-11 DIAGNOSIS — R05.1 ACUTE COUGH: ICD-10-CM

## 2025-04-11 DIAGNOSIS — R05.1 ACUTE COUGH: Primary | ICD-10-CM

## 2025-04-11 DIAGNOSIS — J40 BRONCHITIS: ICD-10-CM

## 2025-04-11 PROCEDURE — 71046 X-RAY EXAM CHEST 2 VIEWS: CPT | Performed by: PHYSICIAN ASSISTANT

## 2025-04-11 RX ORDER — ALBUTEROL SULFATE 90 UG/1
2 INHALANT RESPIRATORY (INHALATION) EVERY 6 HOURS PRN
Qty: 6.7 G | Refills: 0 | Status: SHIPPED | OUTPATIENT
Start: 2025-04-11 | End: 2026-04-11

## 2025-04-11 RX ORDER — PREDNISONE 20 MG/1
40 TABLET ORAL DAILY
Qty: 10 TABLET | Refills: 0 | Status: SHIPPED | OUTPATIENT
Start: 2025-04-11 | End: 2025-04-16

## 2025-04-11 RX ORDER — IPRATROPIUM BROMIDE AND ALBUTEROL SULFATE 2.5; .5 MG/3ML; MG/3ML
3 SOLUTION RESPIRATORY (INHALATION) ONCE
Status: COMPLETED | OUTPATIENT
Start: 2025-04-11 | End: 2025-04-11

## 2025-04-11 RX ORDER — DOXYCYCLINE HYCLATE 100 MG
100 TABLET ORAL 2 TIMES DAILY
Qty: 20 TABLET | Refills: 0 | Status: SHIPPED | OUTPATIENT
Start: 2025-04-11 | End: 2025-04-21

## 2025-04-11 RX ADMIN — IPRATROPIUM BROMIDE AND ALBUTEROL SULFATE 3 ML: 2.5; .5 SOLUTION RESPIRATORY (INHALATION) at 14:43

## 2025-04-11 ASSESSMENT — ENCOUNTER SYMPTOMS
DIAPHORESIS: 1
CHILLS: 1
COUGH: 1
WHEEZING: 1
FEVER: 1
SORE THROAT: 1

## 2025-04-11 NOTE — PROGRESS NOTES
Subjective   Patient ID: Justin Roberson is a 74 y.o. female. They present today with a chief complaint of Cough (Patient was seen at [facility] and sent here, for a chest xray, cough since wednesday).    History of Present Illness  Patient is a 74 year old female presenting for evaluation of a cough. She was seen at [facility] prior to arrival and sent here for a chest x ray to rule out pneumonia. Negative flu and covid test while there. She reports 3 days of cough and wheezing. No history of asthma or COPD. Cough is dry. She reports sore throat from coughing. Has had chills, sweats and hot flashes but unsure if a fever.       History provided by:  Patient   used: No    Cough  Associated symptoms include chills, a fever, a sore throat and wheezing.       Past Medical History  Allergies as of 04/11/2025 - Reviewed 04/11/2025   Allergen Reaction Noted    Fish oil Hives 02/02/2023       (Not in a hospital admission)       No past medical history on file.    Past Surgical History:   Procedure Laterality Date    OTHER SURGICAL HISTORY  01/30/2020    Hysterectomy        reports that she has never smoked. She has never used smokeless tobacco. She reports that she does not drink alcohol and does not use drugs.    Review of Systems  Review of Systems   Constitutional:  Positive for chills, diaphoresis and fever.   HENT:  Positive for sore throat.    Respiratory:  Positive for cough and wheezing.                                   Objective    Vitals:    04/11/25 1427   BP: 139/82   BP Location: Left arm   Pulse: 107   Resp: 18   Temp: 36.9 °C (98.4 °F)   SpO2: 96%   Weight: 83.9 kg (185 lb)   Height: 1.524 m (5')     No LMP recorded.    Physical Exam  Constitutional:       General: She is not in acute distress.     Appearance: Normal appearance. She is normal weight. She is ill-appearing. She is not toxic-appearing or diaphoretic.   HENT:      Head: Normocephalic. No right periorbital erythema or  left periorbital erythema.      Jaw: There is normal jaw occlusion. No trismus.      Right Ear: Tympanic membrane, ear canal and external ear normal. There is no impacted cerumen.      Left Ear: Tympanic membrane, ear canal and external ear normal. There is no impacted cerumen.      Mouth/Throat:      Mouth: Mucous membranes are moist.      Pharynx: Oropharynx is clear. Uvula midline. No pharyngeal swelling, oropharyngeal exudate, posterior oropharyngeal erythema, uvula swelling or postnasal drip.      Tonsils: No tonsillar exudate or tonsillar abscesses.   Eyes:      General: No scleral icterus.        Right eye: No discharge.         Left eye: No discharge.      Conjunctiva/sclera: Conjunctivae normal.   Neck:      Trachea: Phonation normal.   Cardiovascular:      Rate and Rhythm: Normal rate and regular rhythm.      Heart sounds: No murmur heard.     No friction rub. No gallop.   Pulmonary:      Effort: Pulmonary effort is normal. No respiratory distress.      Breath sounds: No stridor. Wheezing and rhonchi present. No rales.      Comments: Frequent dry cough   Neurological:      Mental Status: She is alert.   Psychiatric:         Mood and Affect: Mood normal.         Behavior: Behavior normal.         Thought Content: Thought content normal.         Procedures    Point of Care Test & Imaging Results from this visit  No results found for this visit on 04/11/25.   Imaging  XR chest 2 views    Result Date: 4/11/2025  1.  No evidence of acute cardiopulmonary process.       MACRO: None   Signed by: Yinka Hackett 4/11/2025 2:55 PM Dictation workstation:   [id number]     Cardiology, Vascular, and Other Imaging  No other imaging results found for the past 2 days      Diagnostic study results (if any) were reviewed by Keyana Rogers PA-C.    Assessment/Plan   Allergies, medications, history, and pertinent labs/EKGs/Imaging reviewed by Keyana Rogers PA-C.     Medical Decision Making  Patient is a 74 year old  female presenting for evaluation of a cough that started a days ago. Hemodynamically stable. Nontoxic appearing, no meningismus. Posterior oropharynx erythematous, no exudates or vesicular lesions. Uvula is midline and there is no asymmetrical swelling of tonsils, drooling, trismus or muffled voice to suggest RPA or PTA. TM without erythema or bulging to suggest otitis media. Lungs with wheezing and rhonchi. Chest x ray without acute process. Given duoneb with improvement. Will treat for bronchitis.  Discussed supportive care, OTC medications as needed, tylenol/ibuprofen for pain or fever, rest, fluids. ER if trouble swallowing, difficulty breathing, lethargy, dizziness, chest pain or shortness of breath.      At time of discharge, patient was clinically well-appearing and appropriate for outpatient management. The patient/parent/guardian was educated regarding diagnosis, supportive care, OTC and Rx medications. The patient/parent/guardian was given the opportunity to ask questions prior to discharge. They verbalized understanding of discussion of treatment plan, expected course of illness and/or injury, indications on when to return to , when to seek further evaluation in ED/call 911, and the need to follow up with PCP and/or specialist as referred. Patient/parent/guardian was provided with work/school documentation if requested. Patient stable upon discharge.     Orders and Diagnoses  Diagnoses and all orders for this visit:  Acute cough  -     XR chest 2 views; Future  -     ipratropium-albuteroL (Duo-Neb) 0.5-2.5 mg/3 mL nebulizer solution 3 mL  Bronchitis  -     doxycycline (Vibra-Tabs) 100 mg tablet; Take 1 tablet (100 mg) by mouth 2 times a day for 10 days. Take with a full glass of water and do not lie down for at least 30 minutes after.  -     predniSONE (Deltasone) 20 mg tablet; Take 2 tablets (40 mg) by mouth once daily for 5 days.  -     albuterol 90 mcg/actuation inhaler; Inhale 2 puffs every 6 hours  if needed for wheezing or other (cough or chest tightness).      Medical Admin Record  Administrations This Visit       ipratropium-albuteroL (Duo-Neb) 0.5-2.5 mg/3 mL nebulizer solution 3 mL       Admin Date  04/11/2025 Action  Given Dose  3 mL Route  nebulization Documented By  Brandy Brar MA                    Patient disposition: Home    Electronically signed by Keyana Rogers PA-C  3:49 PM

## 2025-04-30 ENCOUNTER — TELEPHONE (OUTPATIENT)
Dept: PRIMARY CARE | Facility: CLINIC | Age: 75
End: 2025-04-30
Payer: MEDICARE

## 2025-04-30 NOTE — TELEPHONE ENCOUNTER
Pt called that she feels like for four days she has fluid in her chest. She was told to go to Urgent Care, we found the [facility] location information for her. She said she will go there.

## 2025-05-28 ENCOUNTER — OFFICE VISIT (OUTPATIENT)
Dept: ORTHOPEDIC SURGERY | Facility: CLINIC | Age: 75
End: 2025-05-28
Payer: MEDICARE

## 2025-05-28 DIAGNOSIS — G56.22 CUBITAL TUNNEL SYNDROME ON LEFT: Primary | ICD-10-CM

## 2025-05-28 PROCEDURE — 1036F TOBACCO NON-USER: CPT | Performed by: ORTHOPAEDIC SURGERY

## 2025-05-28 PROCEDURE — 99213 OFFICE O/P EST LOW 20 MIN: CPT | Performed by: ORTHOPAEDIC SURGERY

## 2025-05-28 PROCEDURE — 1159F MED LIST DOCD IN RCRD: CPT | Performed by: ORTHOPAEDIC SURGERY

## 2025-05-28 PROCEDURE — 1160F RVW MEDS BY RX/DR IN RCRD: CPT | Performed by: ORTHOPAEDIC SURGERY

## 2025-05-28 RX ORDER — BUPIVACAINE HYDROCHLORIDE 5 MG/ML
10 INJECTION, SOLUTION EPIDURAL; INTRACAUDAL; PERINEURAL ONCE
OUTPATIENT
Start: 2025-05-28 | End: 2025-05-28

## 2025-05-28 RX ORDER — CEFAZOLIN SODIUM 2 G/100ML
2 INJECTION, SOLUTION INTRAVENOUS ONCE
OUTPATIENT
Start: 2025-05-28 | End: 2025-05-28

## 2025-05-28 NOTE — PROGRESS NOTES
Subjective    Patient ID: Justin Roberson is a 74 y.o. female.    Chief Complaint: Follow-up of the Left Hand (DISCUSS EMG )     Last Surgery: No surgery found  Last Surgery Date: No surgery found    HPI  The patient returns today after undergoing a left upper extremity EMG.  This was done to evaluate for ulnar neuropathy.  She has significant burning sensation in her left ring and middle fingers.    Objective   Ortho Exam  The patient is in no acute distress.  Exam of her left hand and wrist reveals she has no evidence of thenar or intrinsic atrophy.  She has a negative Wartenberg's sign.  However she does have a positive Tinel's test at the cubital tunnel.  There is no evidence of ulnar nerve subluxation.  She has a negative Tinel's test at the carpal tunnel.    Image Results:  The patient has an EMG which shows mild left cubital tunnel syndrome.  There is no evidence of any carpal tunnel syndrome.    Assessment/Plan   Encounter Diagnoses:  Cubital tunnel syndrome on left    Orders Placed This Encounter    Request for Pre-Admission Testing Visit    Basic Metabolic Panel    Case Request Operating Room: DECOMPRESSION, NERVE, ULNAR     Patient has left cubital tunnel syndrome both based on exam and EMG.  We discussed conservative versus surgical treatment options.  She has elected to undergo surgery.  I discussed with her in detail the risk, benefits alternatives of a left ulnar nerve transposition.  The patient voiced understanding and informed consent was obtained.  The patient will call to schedule surgery.

## 2025-05-29 PROBLEM — G56.22 CUBITAL TUNNEL SYNDROME ON LEFT: Status: ACTIVE | Noted: 2025-05-28

## 2025-06-04 ENCOUNTER — PRE-ADMISSION TESTING (OUTPATIENT)
Dept: PREADMISSION TESTING | Facility: HOSPITAL | Age: 75
End: 2025-06-04
Payer: MEDICARE

## 2025-06-04 VITALS
DIASTOLIC BLOOD PRESSURE: 78 MMHG | RESPIRATION RATE: 18 BRPM | OXYGEN SATURATION: 98 % | WEIGHT: 187.39 LBS | BODY MASS INDEX: 36.79 KG/M2 | TEMPERATURE: 95 F | HEIGHT: 60 IN | SYSTOLIC BLOOD PRESSURE: 171 MMHG | HEART RATE: 88 BPM

## 2025-06-04 DIAGNOSIS — Z01.818 PREOP TESTING: Primary | ICD-10-CM

## 2025-06-04 DIAGNOSIS — G56.22 CUBITAL TUNNEL SYNDROME ON LEFT: ICD-10-CM

## 2025-06-04 DIAGNOSIS — E78.5 HYPERLIPIDEMIA, UNSPECIFIED HYPERLIPIDEMIA TYPE: ICD-10-CM

## 2025-06-04 DIAGNOSIS — I10 PRIMARY HYPERTENSION: ICD-10-CM

## 2025-06-04 LAB
ANION GAP SERPL CALC-SCNC: 16 MMOL/L (ref 10–20)
BUN SERPL-MCNC: 23 MG/DL (ref 6–23)
CALCIUM SERPL-MCNC: 10.1 MG/DL (ref 8.6–10.3)
CHLORIDE SERPL-SCNC: 101 MMOL/L (ref 98–107)
CO2 SERPL-SCNC: 24 MMOL/L (ref 21–32)
CREAT SERPL-MCNC: 1.26 MG/DL (ref 0.5–1.05)
EGFRCR SERPLBLD CKD-EPI 2021: 45 ML/MIN/1.73M*2
ERYTHROCYTE [DISTWIDTH] IN BLOOD BY AUTOMATED COUNT: 14.1 % (ref 11.5–14.5)
GLUCOSE SERPL-MCNC: 123 MG/DL (ref 74–99)
HCT VFR BLD AUTO: 35.3 % (ref 36–46)
HGB BLD-MCNC: 11 G/DL (ref 12–16)
MCH RBC QN AUTO: 27.8 PG (ref 26–34)
MCHC RBC AUTO-ENTMCNC: 31.2 G/DL (ref 32–36)
MCV RBC AUTO: 89 FL (ref 80–100)
NRBC BLD-RTO: 0 /100 WBCS (ref 0–0)
PLATELET # BLD AUTO: 342 X10*3/UL (ref 150–450)
POTASSIUM SERPL-SCNC: 4.8 MMOL/L (ref 3.5–5.3)
RBC # BLD AUTO: 3.96 X10*6/UL (ref 4–5.2)
SODIUM SERPL-SCNC: 136 MMOL/L (ref 136–145)
WBC # BLD AUTO: 12.4 X10*3/UL (ref 4.4–11.3)

## 2025-06-04 PROCEDURE — 99204 OFFICE O/P NEW MOD 45 MIN: CPT | Performed by: NURSE PRACTITIONER

## 2025-06-04 PROCEDURE — 80048 BASIC METABOLIC PNL TOTAL CA: CPT | Performed by: ORTHOPAEDIC SURGERY

## 2025-06-04 PROCEDURE — 93010 ELECTROCARDIOGRAM REPORT: CPT | Performed by: INTERNAL MEDICINE

## 2025-06-04 PROCEDURE — 85027 COMPLETE CBC AUTOMATED: CPT

## 2025-06-04 PROCEDURE — 93005 ELECTROCARDIOGRAM TRACING: CPT

## 2025-06-04 PROCEDURE — 36415 COLL VENOUS BLD VENIPUNCTURE: CPT

## 2025-06-04 RX ORDER — LANOLIN ALCOHOL/MO/W.PET/CERES
1000 CREAM (GRAM) TOPICAL DAILY
COMMUNITY

## 2025-06-04 ASSESSMENT — DUKE ACTIVITY SCORE INDEX (DASI)
CAN YOU RUN A SHORT DISTANCE: YES
CAN YOU HAVE SEXUAL RELATIONS: NO
CAN YOU WALK A BLOCK OR TWO ON LEVEL GROUND: YES
DASI METS SCORE: 9.2
CAN YOU DO YARD WORK LIKE RAKING LEAVES, WEEDING OR PUSHING A MOWER: YES
CAN YOU DO LIGHT WORK AROUND THE HOUSE LIKE DUSTING OR WASHING DISHES: YES
CAN YOU TAKE CARE OF YOURSELF (EAT, DRESS, BATHE, OR USE TOILET): YES
CAN YOU CLIMB A FLIGHT OF STAIRS OR WALK UP A HILL: YES
CAN YOU DO MODERATE WORK AROUND THE HOUSE LIKE VACUUMING, SWEEPING FLOORS OR CARRYING GROCERIES: YES
CAN YOU PARTICIPATE IN STRENOUS SPORTS LIKE SWIMMING, SINGLES TENNIS, FOOTBALL, BASKETBALL, OR SKIING: YES
CAN YOU DO HEAVY WORK AROUND THE HOUSE LIKE SCRUBBING FLOORS OR LIFTING AND MOVING HEAVY FURNITURE: YES
CAN YOU WALK INDOORS, SUCH AS AROUND YOUR HOUSE: YES
TOTAL_SCORE: 52.95
CAN YOU PARTICIPATE IN MODERATE RECREATIONAL ACTIVITIES LIKE GOLF, BOWLING, DANCING, DOUBLES TENNIS OR THROWING A BASEBALL OR FOOTBALL: YES

## 2025-06-04 ASSESSMENT — PAIN SCALES - GENERAL: PAINLEVEL_OUTOF10: 9

## 2025-06-04 ASSESSMENT — PAIN DESCRIPTION - DESCRIPTORS: DESCRIPTORS: BURNING;NAGGING

## 2025-06-04 ASSESSMENT — PAIN - FUNCTIONAL ASSESSMENT: PAIN_FUNCTIONAL_ASSESSMENT: 0-10

## 2025-06-04 NOTE — CPM/PAT H&P
CPM/PAT Evaluation       Name: Justin Roberson (Justin DAVIS Roberson)  /Age: 1950/74 y.o.     In-Person       Chief Complaint: PAT for planned LUE procedure    74 yr old female w/PHx of hypothyroid, PE (), HTN, DM II, Left kidney cyst and Left cubital tunnel syndrome referred to PAT for planned Left ulnar nerve transposition w/Dr Main on 2025     Patient reports feeling overall well, denies fever, cough or recent infection. Denies hx of stroke or seizure; does report hx of Covid related bilateral PE with hospitalization in ().  Reports remaining otherwise physically active, enjoy bowling and swimming; denies cardiac or respiratory symptoms. Past surgical hx includes hysterectomy, EGD and colonoscopy; denies past issues with anesthesia.      Followed by PCP (Khalida Zamora DO) 3/20/2025  Followed by cardiology (Rober Benites MD) yearly with planned appt July   Followed by urology (Leonel Erickson MD) 3/2025        Medical History[1]    Surgical History[2]    Patient Sexual activity questions deferred to the physician.    Family History[3]    Allergies[4]    Prior to Admission medications    Medication Sig Start Date End Date Taking? Authorizing Provider   albuterol 90 mcg/actuation inhaler Inhale 2 puffs every 6 hours if needed for wheezing or other (cough or chest tightness). 25  Keyana Rogers PA-C   aspirin 81 mg EC tablet Take 1 tablet (81 mg) by mouth once daily. 20   Historical Provider, MD   blood sugar diagnostic (Contour Next Test Strips) strip TEST ONCE DAILY. 22   Historical Provider, MD   estradiol (Estrace) 1 mg tablet Take 1 tablet (1 mg) by mouth once daily. As directed 20   Historical Provider, MD   levothyroxine (Synthroid, Levoxyl) 50 mcg tablet Take 1 tablet (50 mcg) by mouth once daily in the morning. Take before meals. 3/20/25   Khalida Zamora DO   lisinopriL-hydrochlorothiazide 20-12.5 mg tablet Take 1 tablet by mouth 2 times a day. 3/20/25    Khalida Zamora DO   metFORMIN (Glucophage) 1,000 mg tablet Take 1 tablet (1,000 mg) by mouth 2 times daily (morning and late afternoon). 3/20/25   Khalida Zamora DO   multivitamin-iron-minerals-folic acid (Centrum Silver) 0.4 mg-300 mcg- 250 mcg tablet Take 1 tablet by mouth once daily. 1/30/20   Historical Provider, MD   Repatha Syringe 140 mg/mL injection Inject 1 mL (140 mg) under the skin every 14 (fourteen) days. 8/1/24   Historical Provider, MD   traZODone (Desyrel) 50 mg tablet TAKE 1 TO 2 TABLETS BY MOUTH AS  NEEDED AT BEDTIME FOR SLEEP 3/20/25   Khalida Zamora DO        A ten-point review of systems was completed and is otherwise negative except for what is mentioned in the HPI above.    Physical Exam  Vitals reviewed.   Constitutional:       Appearance: Normal appearance.   HENT:      Head: Normocephalic.      Mouth/Throat:      Mouth: Mucous membranes are moist.   Eyes:      Pupils: Pupils are equal, round, and reactive to light.      Comments: Corrective lenses in use   Cardiovascular:      Rate and Rhythm: Normal rate and regular rhythm.      Heart sounds: Murmur heard.      Comments: 3/6 murmur noted; not new per pt, states has it since childhood  Followed by cardiology for yearly visits  Pulmonary:      Effort: Pulmonary effort is normal.      Breath sounds: Normal breath sounds.   Abdominal:      General: Bowel sounds are normal.   Musculoskeletal:         General: Normal range of motion.   Skin:     General: Skin is warm and dry.   Neurological:      Mental Status: She is alert and oriented to person, place, and time.   Psychiatric:         Mood and Affect: Mood normal.          PAT AIRWAY:   Airway:     Mallampati::  II    TM distance::  >3 FB    Neck ROM::  Full  normal        Testing/Diagnostic: BMP, CBC, TSH, A1c, ecg    Patient Specialist/PCP: Khalida Zamora DO (PCP) 3/20/2025: Roebr Benites MD (cardiology) 7/2024; Leonel Erickson (urology) 3/2025     Visit Vitals  /78   Pulse 88    Temp 35 °C (95 °F) (Tympanic)   Resp 18   Ht 1.524 m (5')   Wt 85 kg (187 lb 6.3 oz)   SpO2 98%   BMI 36.60 kg/m²   Smoking Status Never   BSA 1.9 m²       DASI Risk Score      Flowsheet Row Pre-Admission Testing from 6/4/2025 in [facility] Questionnaire Series Submission from 5/30/2025 in [facility] OR with Generic Provider Marie   Can you take care of yourself (eat, dress, bathe, or use toilet)?  2.75 filed at 06/04/2025 1326 2.75  filed at 05/30/2025 1209   Can you walk indoors, such as around your house? 1.75 filed at 06/04/2025 1326 1.75  filed at 05/30/2025 1209   Can you walk a block or two on level ground?  2.75 filed at 06/04/2025 1326 2.75  filed at 05/30/2025 1209   Can you climb a flight of stairs or walk up a hill? 5.5 filed at 06/04/2025 1326 5.5  filed at 05/30/2025 1209   Can you run a short distance? 8 filed at 06/04/2025 1326 8  filed at 05/30/2025 1209   Can you do light work around the house like dusting or washing dishes? 2.7 filed at 06/04/2025 1326 2.7  filed at 05/30/2025 1209   Can you do moderate work around the house like vacuuming, sweeping floors or carrying groceries? 3.5 filed at 06/04/2025 1326 3.5  filed at 05/30/2025 1209   Can you do heavy work around the house like scrubbing floors or lifting and moving heavy furniture?  8 filed at 06/04/2025 1326 8  filed at 05/30/2025 1209   Can you do yard work like raking leaves, weeding or pushing a mower? 4.5 filed at 06/04/2025 1326 4.5  filed at 05/30/2025 1209   Can you have sexual relations? 0 filed at 06/04/2025 1326 5.25  filed at 05/30/2025 1209   Can you participate in moderate recreational activities like golf, bowling, dancing, doubles tennis or throwing a baseball or football? 6 filed at 06/04/2025 1326 6  filed at 05/30/2025 1209   Can you participate in strenous sports like swimming, singles tennis, football, basketball, or skiing? 7.5 filed at 06/04/2025 1326 0  filed at 05/30/2025 1209   BRAD  SCORE 52.95 filed at 06/04/2025 1326 50.7  filed at 05/30/2025 1209   METS Score (Will be calculated only when all the questions are answered) 9.2 filed at 06/04/2025 1326 9  filed at 05/30/2025 1209          Caprini DVT Assessment    No data to display       Modified Frailty Index    No data to display       [id number] Stroke Risk Points  Current as of just now        N/A 0 to 9 Points:      Last Change: N/A          The IZR1OP1-ZUZb risk score (Lip SOSA, et al. 2009. © 2010 American College [employer] Phys[employer]) quantifies the risk of stroke for a patient with atrial fibrillation. For patients without atrial fibrillation or under the age of 18 this score appears as N/A. Higher score values generally indicate higher risk of stroke.        This score is not applicable to this patient. Components are not calculated.          Revised Cardiac Risk Index    No data to display       Apfel Simplified Score    No data to display       Risk Analysis Index Results This Encounter    No data found in the last 10 encounters.       Stop Bang Score      Flowsheet Row Pre-Admission Testing from 6/4/2025 in [facility] Questionnaire Series Submission from 5/30/2025 in [facility] OR with Generic Provider Marie   Do you snore loudly? 0 filed at 06/04/2025 1326 0  filed at 05/30/2025 1209   Do you often feel tired or fatigued after your sleep? 0 filed at 06/04/2025 1326 0  filed at 05/30/2025 1209   Has anyone ever observed you stop breathing in your sleep? 0 filed at 06/04/2025 1326 0  filed at 05/30/2025 1209   Do you have or are you being treated for high blood pressure? 1 filed at 06/04/2025 1326 1  filed at 05/30/2025 1209   Recent BMI (Calculated) 36.6 filed at 06/04/2025 1326 36.1  filed at 05/30/2025 1209   Is BMI greater than 35 kg/m2? 1=Yes filed at 06/04/2025 1326 1=Yes  filed at 05/30/2025 1209   Age older than 50 years old? 1=Yes filed at 06/04/2025 1326 1=Yes  filed at 05/30/2025 1201   Is your  neck circumference greater than 17 inches (Male) or 16 inches (Female)? 0 filed at 06/04/2025 1326 --   Gender - Male 0=No filed at 06/04/2025 1326 0=No  filed at 05/30/2025 1209   STOP-BANG Total Score 3 filed at 06/04/2025 1326 --          Prodigy: High Risk  Total Score: 12              Prodigy Age Score           ARISCAT Score for Postoperative Pulmonary Complications      Flowsheet Row Pre-Admission Testing from 6/4/2025 in [facility]   Age Calculated Score 3 filed at 06/04/2025 1433   Preoperative SpO2 0 filed at 06/04/2025 1433   Respiratory infection in the last month Either upper or lower (i.e., URI, bronchitis, pneumonia), with fever and antibiotic treatment 0 filed at 06/04/2025 1433   Preoperative anemia (Hgb less than 10 g/dl) 0 filed at 06/04/2025 1433   Surgical incision  0 filed at 06/04/2025 1433   Duration of surgery  0 filed at 06/04/2025 1433   Emergency Procedure  0 filed at 06/04/2025 1433   ARISCAT Total Score  3 filed at 06/04/2025 1433          Sutton Perioperative Risk for Myocardial Infarction or Cardiac Arrest (JA)      Flowsheet Row Pre-Admission Testing from 6/4/2025 in [facility]   Calculated Age Score 1.46 filed at 06/04/2025 1433   Functional Status  0 filed at 06/04/2025 1433   ASA Class  -3.29 filed at 06/04/2025 1433   Creatinine 0 filed at 06/04/2025 1433   Type of Procedure  0.80 filed at 06/04/2025 1433   JA Total Score  -6.28 filed at 06/04/2025 1433   JA % 0.19 filed at 06/04/2025 1433            Assessment and Plan:     # Hypothyroid - continue levothyroxine; TSH 1.93 (3/20/2025)  # Hx PE - r/t covid, bilateral subsegmental PE, US of legs neg (11/2022)  # HTN - hold lisinopril-hctz 24 hrs before surgery  # HLD - on Repatha injections q2 weeks, next dose on 6/12/2025  # DM II - hold metformin day of surgery; A1c 6.5 (3/20/2025)  # Left kidney cyst - under surveillance w/yearly MRIs, followed by nephrology  # Obesity (BMI 36) - activity/diet  discussed/encouraged  # Left cubital tunnel syndrome - Left ulnar nerve transposition w/Dr Main on 6/6/2025    Ecg complete 6/4/2025 - NSR w/sinus arrhythmia (86 bpm)  Medical hx, Allergies, VS and Labs reviewed  Medications addressed w/pre-op instructions provided             [1]   Past Medical History:  Diagnosis Date    Anxiety     Arthritis     Hyperlipidemia     Hypertension     Hyperthyroidism     Joint pain     Type 2 diabetes mellitus     Vision loss    [2]   Past Surgical History:  Procedure Laterality Date    APPENDECTOMY      BREAST BIOPSY      CHOLECYSTECTOMY      COLONOSCOPY      HYSTERECTOMY      OTHER SURGICAL HISTORY  01/30/2020    Hysterectomy    UPPER GASTROINTESTINAL ENDOSCOPY     [3]   Family History  Problem Relation Name Age of Onset    Heart attack Mother      Other (bladder cancer) Father      Lung disease Brother     [4]   Allergies  Allergen Reactions    Fish Oil Hives     swelling

## 2025-06-04 NOTE — PREPROCEDURE INSTRUCTIONS
Medication List            Accurate as of June 4, 2025  1:46 PM. Always use your most recent med list.                albuterol 90 mcg/actuation inhaler  Inhale 2 puffs every 6 hours if needed for wheezing or other (cough or chest tightness).  Medication Adjustments for Surgery: Take/Use as prescribed     aspirin 81 mg EC tablet  Medication Adjustments for Surgery: Take/Use as prescribed     Centrum Silver 0.4 mg-300 mcg- 250 mcg  Generic drug: multivitamin with minerals iron-free  Additional Medication Adjustments for Surgery: Take last dose 7 days before surgery     Contour Next Test Strips  Generic drug: blood sugar diagnostic     cyanocobalamin 1,000 mcg tablet  Commonly known as: Vitamin B-12  Additional Medication Adjustments for Surgery: Take last dose 7 days before surgery     estradiol 1 mg tablet  Commonly known as: Estrace  Medication Adjustments for Surgery: Take/Use as prescribed     levothyroxine 50 mcg tablet  Commonly known as: Synthroid, Levoxyl  Take 1 tablet (50 mcg) by mouth once daily in the morning. Take before meals.  Medication Adjustments for Surgery: Take/Use as prescribed     lisinopriL-hydrochlorothiazide 20-12.5 mg tablet  Take 1 tablet by mouth 2 times a day.  Medication Adjustments for Surgery: Take last dose 1 day (24 hours) before surgery     metFORMIN 1,000 mg tablet  Commonly known as: Glucophage  Take 1 tablet (1,000 mg) by mouth 2 times daily (morning and late afternoon).  Medication Adjustments for Surgery: Do Not take on the morning of surgery     Repatha Syringe 140 mg/mL injection  Generic drug: evolocumab  Medication Adjustments for Surgery: Do Not take on the morning of surgery     traZODone 50 mg tablet  Commonly known as: Desyrel  TAKE 1 TO 2 TABLETS BY MOUTH AS  NEEDED AT BEDTIME FOR SLEEP  Medication Adjustments for Surgery: Take/Use as prescribed                              NPO Instructions:    Do not eat any food after midnight the night before your  surgery/procedure.    Additional Instructions:     Day of Surgery:  You may have clear liquids until TWO hours before surgery/procedure.  This includes water, black tea/coffee, (no milk or cream) apple juice and electrolyte drinks (Gatorade)  Wear  comfortable loose fitting clothing  Do not use moisturizers, creams, lotions or perfume  All jewelry and valuables should be left at home    PRE-OPERATIVE INSTRUCTIONS FOR SURGERY    *Do not eat anything after midnight the night of surgery.  This includes food of any kind (including hard candy, cough drops, mints).     You may have up to 13 ounces of clear liquid until TWO hours prior to your scheduled arrival time  Clear liquids include water, black tea/coffee, (no milk or cream) apple juice and electrolyte drinks (GATORADE).  You may chew gum until TWO hours prior you your surgery/procedure.       *One of our staff members will call you ONE business day before your surgery, between 11am-2 pm to let you know the time to arrive.  If you have not received a call by 2 pm, call [phone]    *When you arrive at the hospital-->GO TO Registration on the ground floor  *Stop smoking 24 hours prior to surgery.  No Marijuana, CBD Oil or Vaping for 48 hours  *No alcohol 24 hours prior to surgery  *You will need a responsible adult to drive you home  -No acrylic nails or nail polish on at least one fingernail, NO polish on toes for foot surgery  -You may be asked to remove your dentures, partial plate, eyeglasses or contact lenses before going to surgery.  Please bring a case for these items.  -Body piercings need to be removed.  Jewelry and valuables should be left at home.  -Put on loose,  comfortable, clean clothing, that will accommodate bandages    *If you have any further questions about your pre-op instructions,  not mentioned in this handout, then call [phone]*

## 2025-06-04 NOTE — H&P (VIEW-ONLY)
CPM/PAT Evaluation       Name: Justin Roberson (Justin DAVIS Roberson)  /Age: 1950/74 y.o.     In-Person       Chief Complaint: PAT for planned LUE procedure    74 yr old female w/PHx of hypothyroid, PE (), HTN, DM II, Left kidney cyst and Left cubital tunnel syndrome referred to PAT for planned Left ulnar nerve transposition w/Dr Main on 2025     Patient reports feeling overall well, denies fever, cough or recent infection. Denies hx of stroke or seizure; does report hx of Covid related bilateral PE with hospitalization in ().  Reports remaining otherwise physically active, enjoy bowling and swimming; denies cardiac or respiratory symptoms. Past surgical hx includes hysterectomy, EGD and colonoscopy; denies past issues with anesthesia.      Followed by PCP (Khalida Zamora DO) 3/20/2025  Followed by cardiology (Rober Benites MD) yearly with planned appt July   Followed by urology (Leonel Erickson MD) 3/2025        Medical History[1]    Surgical History[2]    Patient Sexual activity questions deferred to the physician.    Family History[3]    Allergies[4]    Prior to Admission medications    Medication Sig Start Date End Date Taking? Authorizing Provider   albuterol 90 mcg/actuation inhaler Inhale 2 puffs every 6 hours if needed for wheezing or other (cough or chest tightness). 25  Keyana Rogers PA-C   aspirin 81 mg EC tablet Take 1 tablet (81 mg) by mouth once daily. 20   Historical Provider, MD   blood sugar diagnostic (Contour Next Test Strips) strip TEST ONCE DAILY. 22   Historical Provider, MD   estradiol (Estrace) 1 mg tablet Take 1 tablet (1 mg) by mouth once daily. As directed 20   Historical Provider, MD   levothyroxine (Synthroid, Levoxyl) 50 mcg tablet Take 1 tablet (50 mcg) by mouth once daily in the morning. Take before meals. 3/20/25   Khalida Zamora DO   lisinopriL-hydrochlorothiazide 20-12.5 mg tablet Take 1 tablet by mouth 2 times a day. 3/20/25    Khalida Zamora DO   metFORMIN (Glucophage) 1,000 mg tablet Take 1 tablet (1,000 mg) by mouth 2 times daily (morning and late afternoon). 3/20/25   Khalida Zamora DO   multivitamin-iron-minerals-folic acid (Centrum Silver) 0.4 mg-300 mcg- 250 mcg tablet Take 1 tablet by mouth once daily. 1/30/20   Historical Provider, MD   Repatha Syringe 140 mg/mL injection Inject 1 mL (140 mg) under the skin every 14 (fourteen) days. 8/1/24   Historical Provider, MD   traZODone (Desyrel) 50 mg tablet TAKE 1 TO 2 TABLETS BY MOUTH AS  NEEDED AT BEDTIME FOR SLEEP 3/20/25   Khalida Zamora DO        A ten-point review of systems was completed and is otherwise negative except for what is mentioned in the HPI above.    Physical Exam  Vitals reviewed.   Constitutional:       Appearance: Normal appearance.   HENT:      Head: Normocephalic.      Mouth/Throat:      Mouth: Mucous membranes are moist.   Eyes:      Pupils: Pupils are equal, round, and reactive to light.      Comments: Corrective lenses in use   Cardiovascular:      Rate and Rhythm: Normal rate and regular rhythm.      Heart sounds: Murmur heard.      Comments: 3/6 murmur noted; not new per pt, states has it since childhood  Followed by cardiology for yearly visits  Pulmonary:      Effort: Pulmonary effort is normal.      Breath sounds: Normal breath sounds.   Abdominal:      General: Bowel sounds are normal.   Musculoskeletal:         General: Normal range of motion.   Skin:     General: Skin is warm and dry.   Neurological:      Mental Status: She is alert and oriented to person, place, and time.   Psychiatric:         Mood and Affect: Mood normal.          PAT AIRWAY:   Airway:     Mallampati::  II    TM distance::  >3 FB    Neck ROM::  Full  normal        Testing/Diagnostic: BMP, CBC, TSH, A1c, ecg    Patient Specialist/PCP: Khalida Zamora DO (PCP) 3/20/2025: Rober Benites MD (cardiology) 7/2024; Leonel Erickson (urology) 3/2025     Visit Vitals  /78   Pulse 88    Temp 35 °C (95 °F) (Tympanic)   Resp 18   Ht 1.524 m (5')   Wt 85 kg (187 lb 6.3 oz)   SpO2 98%   BMI 36.60 kg/m²   Smoking Status Never   BSA 1.9 m²       DASI Risk Score      Flowsheet Row Pre-Admission Testing from 6/4/2025 in [facility] Questionnaire Series Submission from 5/30/2025 in [facility] OR with Generic Provider Marie   Can you take care of yourself (eat, dress, bathe, or use toilet)?  2.75 filed at 06/04/2025 1326 2.75  filed at 05/30/2025 1209   Can you walk indoors, such as around your house? 1.75 filed at 06/04/2025 1326 1.75  filed at 05/30/2025 1209   Can you walk a block or two on level ground?  2.75 filed at 06/04/2025 1326 2.75  filed at 05/30/2025 1209   Can you climb a flight of stairs or walk up a hill? 5.5 filed at 06/04/2025 1326 5.5  filed at 05/30/2025 1209   Can you run a short distance? 8 filed at 06/04/2025 1326 8  filed at 05/30/2025 1209   Can you do light work around the house like dusting or washing dishes? 2.7 filed at 06/04/2025 1326 2.7  filed at 05/30/2025 1209   Can you do moderate work around the house like vacuuming, sweeping floors or carrying groceries? 3.5 filed at 06/04/2025 1326 3.5  filed at 05/30/2025 1209   Can you do heavy work around the house like scrubbing floors or lifting and moving heavy furniture?  8 filed at 06/04/2025 1326 8  filed at 05/30/2025 1209   Can you do yard work like raking leaves, weeding or pushing a mower? 4.5 filed at 06/04/2025 1326 4.5  filed at 05/30/2025 1209   Can you have sexual relations? 0 filed at 06/04/2025 1326 5.25  filed at 05/30/2025 1209   Can you participate in moderate recreational activities like golf, bowling, dancing, doubles tennis or throwing a baseball or football? 6 filed at 06/04/2025 1326 6  filed at 05/30/2025 1209   Can you participate in strenous sports like swimming, singles tennis, football, basketball, or skiing? 7.5 filed at 06/04/2025 1326 0  filed at 05/30/2025 1209   BRAD  SCORE 52.95 filed at 06/04/2025 1326 50.7  filed at 05/30/2025 1209   METS Score (Will be calculated only when all the questions are answered) 9.2 filed at 06/04/2025 1326 9  filed at 05/30/2025 1209          Caprini DVT Assessment    No data to display       Modified Frailty Index    No data to display       [id number] Stroke Risk Points  Current as of just now        N/A 0 to 9 Points:      Last Change: N/A          The UUS2DR7-FSOl risk score (Lip SOSA, et al. 2009. © 2010 [employer]) quantifies the risk of stroke for a patient with atrial fibrillation. For patients without atrial fibrillation or under the age of 18 this score appears as N/A. Higher score values generally indicate higher risk of stroke.        This score is not applicable to this patient. Components are not calculated.          Revised Cardiac Risk Index    No data to display       Apfel Simplified Score    No data to display       Risk Analysis Index Results This Encounter    No data found in the last 10 encounters.       Stop Bang Score      Flowsheet Row Pre-Admission Testing from 6/4/2025 in [facility] Questionnaire Series Submission from 5/30/2025 in [facility] OR with Generic Provider Marie   Do you snore loudly? 0 filed at 06/04/2025 1326 0  filed at 05/30/2025 1209   Do you often feel tired or fatigued after your sleep? 0 filed at 06/04/2025 1326 0  filed at 05/30/2025 1209   Has anyone ever observed you stop breathing in your sleep? 0 filed at 06/04/2025 1326 0  filed at 05/30/2025 1209   Do you have or are you being treated for high blood pressure? 1 filed at 06/04/2025 1326 1  filed at 05/30/2025 1209   Recent BMI (Calculated) 36.6 filed at 06/04/2025 1326 36.1  filed at 05/30/2025 1209   Is BMI greater than 35 kg/m2? 1=Yes filed at 06/04/2025 1326 1=Yes  filed at 05/30/2025 1209   Age older than 50 years old? 1=Yes filed at 06/04/2025 1326 1=Yes  filed at 05/30/2025 1207   Is your  neck circumference greater than 17 inches (Male) or 16 inches (Female)? 0 filed at 06/04/2025 1326 --   Gender - Male 0=No filed at 06/04/2025 1326 0=No  filed at 05/30/2025 1209   STOP-BANG Total Score 3 filed at 06/04/2025 1326 --          Prodigy: High Risk  Total Score: 12              Prodigy Age Score           ARISCAT Score for Postoperative Pulmonary Complications      Flowsheet Row Pre-Admission Testing from 6/4/2025 in [facility]   Age Calculated Score 3 filed at 06/04/2025 1433   Preoperative SpO2 0 filed at 06/04/2025 1433   Respiratory infection in the last month Either upper or lower (i.e., URI, bronchitis, pneumonia), with fever and antibiotic treatment 0 filed at 06/04/2025 1433   Preoperative anemia (Hgb less than 10 g/dl) 0 filed at 06/04/2025 1433   Surgical incision  0 filed at 06/04/2025 1433   Duration of surgery  0 filed at 06/04/2025 1433   Emergency Procedure  0 filed at 06/04/2025 1433   ARISCAT Total Score  3 filed at 06/04/2025 1433          Sutton Perioperative Risk for Myocardial Infarction or Cardiac Arrest (JA)      Flowsheet Row Pre-Admission Testing from 6/4/2025 in [facility]   Calculated Age Score 1.46 filed at 06/04/2025 1433   Functional Status  0 filed at 06/04/2025 1433   ASA Class  -3.29 filed at 06/04/2025 1433   Creatinine 0 filed at 06/04/2025 1433   Type of Procedure  0.80 filed at 06/04/2025 1433   JA Total Score  -6.28 filed at 06/04/2025 1433   JA % 0.19 filed at 06/04/2025 1433            Assessment and Plan:     # Hypothyroid - continue levothyroxine; TSH 1.93 (3/20/2025)  # Hx PE - r/t covid, bilateral subsegmental PE, US of legs neg (11/2022)  # HTN - hold lisinopril-hctz 24 hrs before surgery  # HLD - on Repatha injections q2 weeks, next dose on 6/12/2025  # DM II - hold metformin day of surgery; A1c 6.5 (3/20/2025)  # Left kidney cyst - under surveillance w/yearly MRIs, followed by nephrology  # Obesity (BMI 36) - activity/diet  discussed/encouraged  # Left cubital tunnel syndrome - Left ulnar nerve transposition w/Dr Main on 6/6/2025    Ecg complete 6/4/2025 - NSR w/sinus arrhythmia (86 bpm)  Medical hx, Allergies, VS and Labs reviewed  Medications addressed w/pre-op instructions provided             [1]   Past Medical History:  Diagnosis Date    Anxiety     Arthritis     Hyperlipidemia     Hypertension     Hyperthyroidism     Joint pain     Type 2 diabetes mellitus     Vision loss    [2]   Past Surgical History:  Procedure Laterality Date    APPENDECTOMY      BREAST BIOPSY      CHOLECYSTECTOMY      COLONOSCOPY      HYSTERECTOMY      OTHER SURGICAL HISTORY  01/30/2020    Hysterectomy    UPPER GASTROINTESTINAL ENDOSCOPY     [3]   Family History  Problem Relation Name Age of Onset    Heart attack Mother      Other (bladder cancer) Father      Lung disease Brother     [4]   Allergies  Allergen Reactions    Fish Oil Hives     swelling

## 2025-06-05 ENCOUNTER — ANESTHESIA EVENT (OUTPATIENT)
Dept: OPERATING ROOM | Facility: HOSPITAL | Age: 75
End: 2025-06-05
Payer: MEDICARE

## 2025-06-06 ENCOUNTER — ANESTHESIA (OUTPATIENT)
Dept: OPERATING ROOM | Facility: HOSPITAL | Age: 75
End: 2025-06-06
Payer: MEDICARE

## 2025-06-06 ENCOUNTER — HOSPITAL ENCOUNTER (OUTPATIENT)
Facility: HOSPITAL | Age: 75
Setting detail: OUTPATIENT SURGERY
Discharge: HOME | End: 2025-06-06
Attending: ORTHOPAEDIC SURGERY | Admitting: ORTHOPAEDIC SURGERY
Payer: MEDICARE

## 2025-06-06 VITALS
WEIGHT: 187.39 LBS | RESPIRATION RATE: 18 BRPM | BODY MASS INDEX: 36.79 KG/M2 | OXYGEN SATURATION: 96 % | HEIGHT: 60 IN | DIASTOLIC BLOOD PRESSURE: 68 MMHG | SYSTOLIC BLOOD PRESSURE: 140 MMHG | HEART RATE: 99 BPM | TEMPERATURE: 96.8 F

## 2025-06-06 DIAGNOSIS — G56.22 CUBITAL TUNNEL SYNDROME ON LEFT: Primary | ICD-10-CM

## 2025-06-06 LAB — GLUCOSE BLD MANUAL STRIP-MCNC: 153 MG/DL (ref 74–99)

## 2025-06-06 PROCEDURE — 3600000008 HC OR TIME - EACH INCREMENTAL 1 MINUTE - PROCEDURE LEVEL THREE: Performed by: ORTHOPAEDIC SURGERY

## 2025-06-06 PROCEDURE — 2500000005 HC RX 250 GENERAL PHARMACY W/O HCPCS: Performed by: ORTHOPAEDIC SURGERY

## 2025-06-06 PROCEDURE — 7100000009 HC PHASE TWO TIME - INITIAL BASE CHARGE: Performed by: ORTHOPAEDIC SURGERY

## 2025-06-06 PROCEDURE — 2500000004 HC RX 250 GENERAL PHARMACY W/ HCPCS (ALT 636 FOR OP/ED): Performed by: ANESTHESIOLOGIST ASSISTANT

## 2025-06-06 PROCEDURE — 2500000004 HC RX 250 GENERAL PHARMACY W/ HCPCS (ALT 636 FOR OP/ED): Performed by: ORTHOPAEDIC SURGERY

## 2025-06-06 PROCEDURE — 2500000004 HC RX 250 GENERAL PHARMACY W/ HCPCS (ALT 636 FOR OP/ED): Performed by: ANESTHESIOLOGY

## 2025-06-06 PROCEDURE — 82947 ASSAY GLUCOSE BLOOD QUANT: CPT

## 2025-06-06 PROCEDURE — 2720000007 HC OR 272 NO HCPCS: Performed by: ORTHOPAEDIC SURGERY

## 2025-06-06 PROCEDURE — 2500000001 HC RX 250 WO HCPCS SELF ADMINISTERED DRUGS (ALT 637 FOR MEDICARE OP): Performed by: ANESTHESIOLOGY

## 2025-06-06 PROCEDURE — 7100000002 HC RECOVERY ROOM TIME - EACH INCREMENTAL 1 MINUTE: Performed by: ORTHOPAEDIC SURGERY

## 2025-06-06 PROCEDURE — 3700000002 HC GENERAL ANESTHESIA TIME - EACH INCREMENTAL 1 MINUTE: Performed by: ORTHOPAEDIC SURGERY

## 2025-06-06 PROCEDURE — 64718 REVISE ULNAR NERVE AT ELBOW: CPT | Performed by: ORTHOPAEDIC SURGERY

## 2025-06-06 PROCEDURE — 7100000001 HC RECOVERY ROOM TIME - INITIAL BASE CHARGE: Performed by: ORTHOPAEDIC SURGERY

## 2025-06-06 PROCEDURE — 7100000010 HC PHASE TWO TIME - EACH INCREMENTAL 1 MINUTE: Performed by: ORTHOPAEDIC SURGERY

## 2025-06-06 PROCEDURE — 3600000003 HC OR TIME - INITIAL BASE CHARGE - PROCEDURE LEVEL THREE: Performed by: ORTHOPAEDIC SURGERY

## 2025-06-06 PROCEDURE — 3700000001 HC GENERAL ANESTHESIA TIME - INITIAL BASE CHARGE: Performed by: ORTHOPAEDIC SURGERY

## 2025-06-06 RX ORDER — HYDRALAZINE HYDROCHLORIDE 20 MG/ML
10 INJECTION INTRAMUSCULAR; INTRAVENOUS EVERY 30 MIN PRN
Status: DISCONTINUED | OUTPATIENT
Start: 2025-06-06 | End: 2025-06-06 | Stop reason: HOSPADM

## 2025-06-06 RX ORDER — SODIUM CHLORIDE, SODIUM LACTATE, POTASSIUM CHLORIDE, CALCIUM CHLORIDE 600; 310; 30; 20 MG/100ML; MG/100ML; MG/100ML; MG/100ML
100 INJECTION, SOLUTION INTRAVENOUS CONTINUOUS
Status: DISCONTINUED | OUTPATIENT
Start: 2025-06-06 | End: 2025-06-06 | Stop reason: HOSPADM

## 2025-06-06 RX ORDER — METOPROLOL TARTRATE 1 MG/ML
5 INJECTION, SOLUTION INTRAVENOUS ONCE
Status: DISCONTINUED | OUTPATIENT
Start: 2025-06-06 | End: 2025-06-06 | Stop reason: HOSPADM

## 2025-06-06 RX ORDER — SCOPOLAMINE 1 MG/3D
1 PATCH, EXTENDED RELEASE TRANSDERMAL ONCE
Status: DISCONTINUED | OUTPATIENT
Start: 2025-06-06 | End: 2025-06-06 | Stop reason: HOSPADM

## 2025-06-06 RX ORDER — LABETALOL HYDROCHLORIDE 5 MG/ML
10 INJECTION, SOLUTION INTRAVENOUS ONCE AS NEEDED
Status: DISCONTINUED | OUTPATIENT
Start: 2025-06-06 | End: 2025-06-06 | Stop reason: HOSPADM

## 2025-06-06 RX ORDER — ONDANSETRON HYDROCHLORIDE 2 MG/ML
4 INJECTION, SOLUTION INTRAVENOUS ONCE AS NEEDED
Status: DISCONTINUED | OUTPATIENT
Start: 2025-06-06 | End: 2025-06-06 | Stop reason: HOSPADM

## 2025-06-06 RX ORDER — ALBUTEROL SULFATE 0.83 MG/ML
2.5 SOLUTION RESPIRATORY (INHALATION) ONCE AS NEEDED
Status: DISCONTINUED | OUTPATIENT
Start: 2025-06-06 | End: 2025-06-06 | Stop reason: HOSPADM

## 2025-06-06 RX ORDER — PROPOFOL 10 MG/ML
INJECTION, EMULSION INTRAVENOUS AS NEEDED
Status: DISCONTINUED | OUTPATIENT
Start: 2025-06-06 | End: 2025-06-06

## 2025-06-06 RX ORDER — MIDAZOLAM HYDROCHLORIDE 1 MG/ML
1 INJECTION, SOLUTION INTRAMUSCULAR; INTRAVENOUS ONCE AS NEEDED
Status: DISCONTINUED | OUTPATIENT
Start: 2025-06-06 | End: 2025-06-06 | Stop reason: HOSPADM

## 2025-06-06 RX ORDER — LIDOCAINE HCL/PF 100 MG/5ML
SYRINGE (ML) INTRAVENOUS AS NEEDED
Status: DISCONTINUED | OUTPATIENT
Start: 2025-06-06 | End: 2025-06-06

## 2025-06-06 RX ORDER — FAMOTIDINE 10 MG/ML
20 INJECTION, SOLUTION INTRAVENOUS ONCE
Status: DISCONTINUED | OUTPATIENT
Start: 2025-06-06 | End: 2025-06-06 | Stop reason: HOSPADM

## 2025-06-06 RX ORDER — CEFAZOLIN SODIUM 2 G/50ML
2 SOLUTION INTRAVENOUS ONCE
Status: DISCONTINUED | OUTPATIENT
Start: 2025-06-06 | End: 2025-06-06 | Stop reason: HOSPADM

## 2025-06-06 RX ORDER — TRAMADOL HYDROCHLORIDE 50 MG/1
50 TABLET, FILM COATED ORAL EVERY 6 HOURS PRN
Qty: 28 TABLET | Refills: 0 | Status: SHIPPED | OUTPATIENT
Start: 2025-06-06 | End: 2025-06-13

## 2025-06-06 RX ORDER — DIPHENHYDRAMINE HYDROCHLORIDE 50 MG/ML
25 INJECTION, SOLUTION INTRAMUSCULAR; INTRAVENOUS ONCE AS NEEDED
Status: DISCONTINUED | OUTPATIENT
Start: 2025-06-06 | End: 2025-06-06 | Stop reason: HOSPADM

## 2025-06-06 RX ORDER — FENTANYL CITRATE 50 UG/ML
INJECTION, SOLUTION INTRAMUSCULAR; INTRAVENOUS AS NEEDED
Status: DISCONTINUED | OUTPATIENT
Start: 2025-06-06 | End: 2025-06-06

## 2025-06-06 RX ORDER — METOCLOPRAMIDE HYDROCHLORIDE 5 MG/ML
10 INJECTION INTRAMUSCULAR; INTRAVENOUS ONCE AS NEEDED
Status: DISCONTINUED | OUTPATIENT
Start: 2025-06-06 | End: 2025-06-06 | Stop reason: HOSPADM

## 2025-06-06 RX ORDER — MORPHINE SULFATE 2 MG/ML
2 INJECTION, SOLUTION INTRAMUSCULAR; INTRAVENOUS EVERY 5 MIN PRN
Status: DISCONTINUED | OUTPATIENT
Start: 2025-06-06 | End: 2025-06-06 | Stop reason: HOSPADM

## 2025-06-06 RX ORDER — ACETAMINOPHEN 325 MG/1
975 TABLET ORAL ONCE
Status: COMPLETED | OUTPATIENT
Start: 2025-06-06 | End: 2025-06-06

## 2025-06-06 RX ORDER — ONDANSETRON HYDROCHLORIDE 2 MG/ML
INJECTION, SOLUTION INTRAVENOUS AS NEEDED
Status: DISCONTINUED | OUTPATIENT
Start: 2025-06-06 | End: 2025-06-06

## 2025-06-06 RX ORDER — CEFAZOLIN 1 G/1
INJECTION, POWDER, FOR SOLUTION INTRAVENOUS AS NEEDED
Status: DISCONTINUED | OUTPATIENT
Start: 2025-06-06 | End: 2025-06-06

## 2025-06-06 RX ORDER — BUPIVACAINE HYDROCHLORIDE 5 MG/ML
INJECTION, SOLUTION PERINEURAL AS NEEDED
Status: DISCONTINUED | OUTPATIENT
Start: 2025-06-06 | End: 2025-06-06 | Stop reason: HOSPADM

## 2025-06-06 RX ORDER — MIDAZOLAM HYDROCHLORIDE 1 MG/ML
INJECTION, SOLUTION INTRAMUSCULAR; INTRAVENOUS AS NEEDED
Status: DISCONTINUED | OUTPATIENT
Start: 2025-06-06 | End: 2025-06-06

## 2025-06-06 RX ORDER — SODIUM CHLORIDE 0.9 G/100ML
INJECTION, SOLUTION IRRIGATION AS NEEDED
Status: DISCONTINUED | OUTPATIENT
Start: 2025-06-06 | End: 2025-06-06 | Stop reason: HOSPADM

## 2025-06-06 RX ORDER — PHENYLEPHRINE HCL IN 0.9% NACL 1 MG/10 ML
SYRINGE (ML) INTRAVENOUS AS NEEDED
Status: DISCONTINUED | OUTPATIENT
Start: 2025-06-06 | End: 2025-06-06

## 2025-06-06 RX ORDER — HYDROMORPHONE HYDROCHLORIDE 1 MG/ML
1 INJECTION, SOLUTION INTRAMUSCULAR; INTRAVENOUS; SUBCUTANEOUS EVERY 5 MIN PRN
Status: DISCONTINUED | OUTPATIENT
Start: 2025-06-06 | End: 2025-06-06 | Stop reason: HOSPADM

## 2025-06-06 RX ORDER — SODIUM CHLORIDE, SODIUM LACTATE, POTASSIUM CHLORIDE, CALCIUM CHLORIDE 600; 310; 30; 20 MG/100ML; MG/100ML; MG/100ML; MG/100ML
INJECTION, SOLUTION INTRAVENOUS CONTINUOUS PRN
Status: DISCONTINUED | OUTPATIENT
Start: 2025-06-06 | End: 2025-06-06

## 2025-06-06 RX ADMIN — MORPHINE SULFATE 2 MG: 2 INJECTION, SOLUTION INTRAMUSCULAR; INTRAVENOUS at 10:15

## 2025-06-06 RX ADMIN — Medication 100 MCG: at 09:26

## 2025-06-06 RX ADMIN — Medication 100 MCG: at 09:37

## 2025-06-06 RX ADMIN — FENTANYL CITRATE 100 MCG: 50 INJECTION, SOLUTION INTRAMUSCULAR; INTRAVENOUS at 08:57

## 2025-06-06 RX ADMIN — LIDOCAINE HYDROCHLORIDE 100 MG: 20 INJECTION INTRAVENOUS at 08:57

## 2025-06-06 RX ADMIN — Medication 100 MCG: at 09:32

## 2025-06-06 RX ADMIN — Medication 100 MCG: at 09:28

## 2025-06-06 RX ADMIN — SODIUM CHLORIDE, POTASSIUM CHLORIDE, SODIUM LACTATE AND CALCIUM CHLORIDE: 600; 310; 30; 20 INJECTION, SOLUTION INTRAVENOUS at 08:50

## 2025-06-06 RX ADMIN — CEFAZOLIN 2 G: 330 INJECTION, POWDER, FOR SOLUTION INTRAMUSCULAR; INTRAVENOUS at 09:02

## 2025-06-06 RX ADMIN — MORPHINE SULFATE 2 MG: 2 INJECTION, SOLUTION INTRAMUSCULAR; INTRAVENOUS at 10:07

## 2025-06-06 RX ADMIN — Medication 100 MCG: at 09:34

## 2025-06-06 RX ADMIN — PROPOFOL 150 MG: 10 INJECTION, EMULSION INTRAVENOUS at 08:57

## 2025-06-06 RX ADMIN — MORPHINE SULFATE 2 MG: 2 INJECTION, SOLUTION INTRAMUSCULAR; INTRAVENOUS at 10:29

## 2025-06-06 RX ADMIN — ONDANSETRON 4 MG: 2 INJECTION INTRAMUSCULAR; INTRAVENOUS at 09:30

## 2025-06-06 RX ADMIN — ACETAMINOPHEN 975 MG: 325 TABLET ORAL at 10:36

## 2025-06-06 RX ADMIN — MIDAZOLAM 2 MG: 1 INJECTION INTRAMUSCULAR; INTRAVENOUS at 08:54

## 2025-06-06 RX ADMIN — MORPHINE SULFATE 2 MG: 2 INJECTION, SOLUTION INTRAMUSCULAR; INTRAVENOUS at 10:21

## 2025-06-06 RX ADMIN — FENTANYL CITRATE 50 MCG: 50 INJECTION, SOLUTION INTRAMUSCULAR; INTRAVENOUS at 09:12

## 2025-06-06 SDOH — HEALTH STABILITY: MENTAL HEALTH: CURRENT SMOKER: 0

## 2025-06-06 ASSESSMENT — PAIN DESCRIPTION - ORIENTATION
ORIENTATION: LEFT

## 2025-06-06 ASSESSMENT — PAIN - FUNCTIONAL ASSESSMENT
PAIN_FUNCTIONAL_ASSESSMENT: 0-10

## 2025-06-06 ASSESSMENT — PAIN SCALES - GENERAL
PAINLEVEL_OUTOF10: 5 - MODERATE PAIN
PAINLEVEL_OUTOF10: 0 - NO PAIN
PAINLEVEL_OUTOF10: 6
PAINLEVEL_OUTOF10: 0 - NO PAIN
PAINLEVEL_OUTOF10: 3
PAINLEVEL_OUTOF10: 3
PAINLEVEL_OUTOF10: 6
PAINLEVEL_OUTOF10: 0 - NO PAIN
PAINLEVEL_OUTOF10: 0 - NO PAIN

## 2025-06-06 ASSESSMENT — PAIN DESCRIPTION - LOCATION
LOCATION: ARM

## 2025-06-06 ASSESSMENT — PAIN DESCRIPTION - DESCRIPTORS
DESCRIPTORS: BURNING
DESCRIPTORS: OTHER (COMMENT)
DESCRIPTORS: BURNING

## 2025-06-06 NOTE — BRIEF OP NOTE
Date: 2025  OR Location: [facility]    Name: Justin Roberson, : 1950, Age: 74 y.o., MRN: [id number], Sex: female    Diagnosis  Pre-op Diagnosis      * Cubital tunnel syndrome on left [G56.22] Post-op Diagnosis     * Cubital tunnel syndrome on left [G56.22]     Procedures  LEFT UNLAR NERVE TRANSPOSITION  22620 - OR NEUROPLASTY &/TRANSPOSITION ULNAR NERVE ELBOW      Surgeons      * Andrew Main - Primary    Resident/Fellow/Other Assistant:  Surgeons and Role:  * No surgeons found with a matching role *    Staff:   Circulator: Carmen  Circulator: Sushma  Surgical Assistant: Willy Peralta Person: Joaquin Cerda Scrub: Anabell  Circulator: Chrystal    Anesthesia Staff: Anesthesiologist: Miguel Acosta MD  C-AA: ROBERT Simeon  MASON: Serafin Nunez    Procedure Summary  Anesthesia: General  ASA: III  Estimated Blood Loss: 5 mL  Intra-op Medications:   Administrations occurring from 0900 to 1010 on 25:   Medication Name Total Dose   sodium chloride 0.9 % irrigation solution 1,000 mL   BUPivacaine HCl (Marcaine) 0.5 % (5 mg/mL) injection 10 mL   ceFAZolin (Ancef) vial 1 g 2 g   fentaNYL (Sublimaze) injection 50 mcg/mL 50 mcg   ondansetron (Zofran) 2 mg/mL injection 4 mg   phenylephrine 100 mcg/mL syringe 10 mL (prefilled) 500 mcg              Anesthesia Record               Intraprocedure I/O Totals       None           Specimen: No specimens collected               Findings: Compression of ulnar nerve at cubital tunnel by Farmer's fascia    Complications:  None; patient tolerated the procedure well.     Disposition: PACU - hemodynamically stable.  Condition: stable  Specimens Collected: No specimens collected  Attending Attestation: I performed the procedure.    Andrew Main  Phone Number: [phone]

## 2025-06-06 NOTE — ANESTHESIA PROCEDURE NOTES
Airway  Date/Time: 6/6/2025 8:58 AM  Reason: elective    Airway not difficult    Staffing  Performed: ROBERT   Authorized by: Miguel Acosta MD    Performed by: ROBERT Simeon  Patient location during procedure: OR    Patient Condition  Indications for airway management: anesthesia  MILS maintained throughout  Planned trial extubation  Sedation level: deep     Final Airway Details   Preoxygenated: yes  Final airway type: supraglottic airway (I Gel)  Successful airway:   Size: 4  Number of attempts at approach: 1  Number of other approaches attempted: 0    Additional Comments  Easy LMA insertion. Lips and teeth in preanesthetic condition.

## 2025-06-06 NOTE — ANESTHESIA PREPROCEDURE EVALUATION
Patient: Justin Roberson    Procedure Information       Date/Time: 06/06/25 0900    Procedure: LEFT UNLAR NERVE TRANSPOSITION (Left: Elbow)    Location: [facility] 07 / [facility]    Surgeons: Andrew Main MD            Relevant Problems   Anesthesia   (-) Difficult intubation   (-) PONV (postoperative nausea and vomiting)      Cardiac   (+) Aortic insufficiency with aortic stenosis   (+) Aortic stenosis   (+) HLD (hyperlipidemia)   (+) Hypertension   (+) Hypertriglyceridemia   (+) Moderate aortic regurgitation   (+) Murmur, cardiac      Neuro   (+) Cubital tunnel syndrome on left      Liver   (+) Elevated LFTs      Endocrine   (+) Chronic renal impairment associated with type 2 diabetes mellitus   (+) Diabetes mellitus, type 2 (Multi)   (+) Hypothyroidism   (+) Obesity   (+) Obesity, morbid (Multi)   (+) Type 2 diabetes mellitus with hypertriglyceridemia (Multi)      Hematology   (+) Anemia       Clinical information reviewed:                   NPO Detail:  No data recorded     Physical Exam    Airway  Mallampati: II  TM distance: >3 FB  Neck ROM: full     Cardiovascular - normal exam  Rhythm: regular  Rate: normal     Dental    Pulmonary - normal exam   Abdominal            Anesthesia Plan    History of general anesthesia?: yes  History of complications of general anesthesia?: no    ASA 3     general     The patient is not a current smoker.  Education provided regarding risk of obstructive sleep apnea.  intravenous induction   Postoperative pain plan includes opioids.  Trial extubation is planned.  Anesthetic plan and risks discussed with patient.    Plan discussed with CRNA, CAA and attending.

## 2025-06-06 NOTE — OP NOTE
LEFT UNLAR NERVE TRANSPOSITION (L) Operative Note     Date: 2025  OR Location: [facility]    Name: Justin Roberson, : 1950, Age: 74 y.o., MRN: [id number], Sex: female    Diagnosis  Pre-op Diagnosis      * Cubital tunnel syndrome on left [G56.22] Post-op Diagnosis     * Cubital tunnel syndrome on left [G56.22]     Procedures  LEFT UNLAR NERVE TRANSPOSITION  99013 - CA NEUROPLASTY &/TRANSPOSITION ULNAR NERVE ELBOW      Surgeons      * Andrew Main - Primary    Resident/Fellow/Other Assistant:  Surgeons and Role:  * No surgeons found with a matching role *    Staff:   Circulator: Carmen  Circulator: Sushma  Surgical Assistant: Willy Peralta Person: Joaquin Guamanub: Anabell  Circulator: Chrystal    Anesthesia Staff: Anesthesiologist: Miguel Acosta MD  C-AA: ROBERT Simeon  MASON: Serafin Nunez    Procedure Summary  Anesthesia: General  ASA: III  Estimated Blood Loss: 5 mL  Intra-op Medications:   Administrations occurring from 0900 to 1010 on 25:   Medication Name Total Dose   sodium chloride 0.9 % irrigation solution 1,000 mL   BUPivacaine HCl (Marcaine) 0.5 % (5 mg/mL) injection 10 mL   ceFAZolin (Ancef) vial 1 g 2 g   fentaNYL (Sublimaze) injection 50 mcg/mL 50 mcg   ondansetron (Zofran) 2 mg/mL injection 4 mg   phenylephrine 100 mcg/mL syringe 10 mL (prefilled) 500 mcg              Anesthesia Record               Intraprocedure I/O Totals       None           Specimen: No specimens collected              Drains and/or Catheters: * None in log *    Tourniquet Times:     Total Tourniquet Time Documented:  Arm - Lower (Left) - 22 minutes  Total: Arm - Lower (Left) - 22 minutes      Implants:     Findings: Compression of ulnar nerve at cubital tunnel by Farmer's fascia    Indications: Justin Roberson is an 74 y.o. female who is having surgery for Cubital tunnel syndrome on left [G56.22].  Patient had history of paresthesias in her left ring and little finger.  An EMG confirmed ulnar neuropathy at  the left elbow.  The patient had tried and failed conservative management.  We then discussed surgical treatment options including a left ulnar nerve transposition.  I explained to the patient that the risks of the procedure include but are not limited to infection, continued numbness and paresthesias, postoperative pain and stiffness, as well as risks associate with anesthesia.  The patient voiced understanding and informed consent was obtained.    The patient was seen in the preoperative area. The risks, benefits, complications, treatment options, non-operative alternatives, expected recovery and outcomes were discussed with the patient. The possibilities of reaction to medication, pulmonary aspiration, injury to surrounding structures, bleeding, recurrent infection, the need for additional procedures, failure to diagnose a condition, and creating a complication requiring transfusion or operation were discussed with the patient. The patient concurred with the proposed plan, giving informed consent.  The site of surgery was properly noted/marked if necessary per policy. The patient has been actively warmed in preoperative area. Preoperative antibiotics have been ordered and given within 1 hours of incision. Venous thrombosis prophylaxis with bilateral lower extremity compressive devices were used.    Procedure Details: The patient was prepped identified in the preoperative waiting area and her left elbow was marked as site of surgery.  Ancef was administered intravenously.  Patient was taken back to the operating room suite placed supine on the OR table.  A nonsterile tourniquet was applied to the patient's left upper extremity.  After general anesthesia with LMA was administered patient's left upper extremity was then prepped and draped in the usual sterile fashion.  A preoperative verification timeout was taken.  Patient's left upper extremity was exsanguinated with an Esmarch bandage and the tourniquet inflated  to 250 mmHg.  Approximately a 6 inch curvilinear incision was made posterior to the mid epicondyle.  Sharp dissection was carried through skin and subcutaneous tissue.  Electrocautery was used to achieve hemostasis.  Care was taken to identify and protect cutaneous nerve branches.  Dissection was carried to the medial epicondyle.  Intermuscular septum was released proximally.  A soft bed for transposition was created.  Decompression of the ulnar nerve was started right at the level of the mid epicondyle and continued initially proximally.  There is significant compression of the nerve noted with Farmer's fascia.  The remainder of the nerve was then decompressed distally.  Care was taken to protect the distal motor branches.  At this point the ulnar nerve was then transposed anteriorly and subcutaneously.  Transposition was secured using 2-0 Ethibond sutures.  There is no significant tension noted on the nerve with flexion or extension of the elbow.  Tourniquet was let down after 22 minutes.  Good vascular return was seen to the patient's left hand and all digits.  Subcutaneous tissue was closed with 3-0 Vicryl.  Skin was closed with a running 4-0 Vicryl suture.  10 mL of half percent plain Marcaine was injected as local anesthetic.  Sterile bandages consisting of Xeroform, gauze 4 x 4's, Webril and Ace wrap was applied to the patient's left elbow.  Patient was awakened from anesthesia and returned to recovery room stable condition.  There are no complications or any case.  All sponge and needle counts were correct at the end of the case.  Evidence of Infection: No   Complications:  None; patient tolerated the procedure well.    Disposition: PACU - hemodynamically stable.  Condition: stable         Task Performed by RNFA or Surgical Assistant:  Surgical assistant helped with retraction, subcutaneous and skin closure and application of bandage.          Additional Details: None    Attending Attestation: I performed  the procedure.    Andrew Main  Phone Number: [phone]

## 2025-06-06 NOTE — SIGNIFICANT EVENT
Discharge instructions reviewed and provided to patient and patients daughter. Patient denies questions related to material reviewed. Patient awake/alert. VSS. Patient denies pain/nausea. Patient tolerates PO well. Surgical dressing c/d/I.

## 2025-06-06 NOTE — ANESTHESIA POSTPROCEDURE EVALUATION
Patient: Justin Roberson    Procedure Summary       Date: 06/06/25 Room / Location: [facility] 07 / [facility]    Anesthesia Start: 0850 Anesthesia Stop: 0956    Procedure: LEFT UNLAR NERVE TRANSPOSITION (Left: Elbow) Diagnosis:       Cubital tunnel syndrome on left      (Cubital tunnel syndrome on left [G56.22])    Surgeons: Andrew Main MD Responsible Provider: Miguel Acosta MD    Anesthesia Type: general ASA Status: 3            Anesthesia Type: general    Vitals Value Taken Time   /67 06/06/25 09:54   Temp 37.1 °C (98.8 °F) 06/06/25 09:54   Pulse 105 06/06/25 09:55   Resp 18 06/06/25 09:54   SpO2 100 % 06/06/25 09:55   Vitals shown include unfiled device data.    Anesthesia Post Evaluation    Patient location during evaluation: PACU  Patient participation: complete - patient participated  Level of consciousness: awake  Pain management: adequate  Airway patency: patent  Cardiovascular status: acceptable  Respiratory status: acceptable  Hydration status: acceptable  Postoperative Nausea and Vomiting: none        There were no known notable events for this encounter.

## 2025-06-08 LAB
ATRIAL RATE: 86 BPM
P AXIS: 46 DEGREES
P OFFSET: 195 MS
P ONSET: 147 MS
PR INTERVAL: 156 MS
Q ONSET: 225 MS
QRS COUNT: 14 BEATS
QRS DURATION: 62 MS
QT INTERVAL: 334 MS
QTC CALCULATION(BAZETT): 399 MS
QTC FREDERICIA: 376 MS
R AXIS: 27 DEGREES
T AXIS: 56 DEGREES
T OFFSET: 392 MS
VENTRICULAR RATE: 86 BPM

## 2025-06-18 ENCOUNTER — OFFICE VISIT (OUTPATIENT)
Dept: ORTHOPEDIC SURGERY | Facility: CLINIC | Age: 75
End: 2025-06-18
Payer: MEDICARE

## 2025-06-18 DIAGNOSIS — G56.22 CUBITAL TUNNEL SYNDROME ON LEFT: Primary | ICD-10-CM

## 2025-06-18 PROCEDURE — 99212 OFFICE O/P EST SF 10 MIN: CPT | Performed by: ORTHOPAEDIC SURGERY

## 2025-06-18 PROCEDURE — 1159F MED LIST DOCD IN RCRD: CPT | Performed by: ORTHOPAEDIC SURGERY

## 2025-06-18 PROCEDURE — 1160F RVW MEDS BY RX/DR IN RCRD: CPT | Performed by: ORTHOPAEDIC SURGERY

## 2025-06-18 PROCEDURE — 1036F TOBACCO NON-USER: CPT | Performed by: ORTHOPAEDIC SURGERY

## 2025-06-18 PROCEDURE — 99024 POSTOP FOLLOW-UP VISIT: CPT | Performed by: ORTHOPAEDIC SURGERY

## 2025-06-18 NOTE — PROGRESS NOTES
Subjective    Patient ID: Justin Roberson is a 74 y.o. female.    Chief Complaint: Post-op of the Left Hand (POV L UNT DOS 6/6/25)     Last Surgery: Left Unlar Nerve Transposition - Left  Last Surgery Date: 6/6/2025    HPI  Patient comes in for her postop appointment after undergoing a left ulnar nerve transposition.  She states that within 2 days of surgery, she noticed significant improvement in sensation in her left ring and little fingers.  She was able to make a tight fist without any difficulty.  Objective   Ortho Exam  The patient is in no acute distress.  Exam of her left upper extremity reveals her incision is well-healed.  There is no evidence of infection.  She had pain-free motion at her shoulder, elbow and wrist.  There was no thenar intrinsic atrophy.  She had a negative Wartenberg's sign.  She states that this sensation is nearly normal in her ring and little fingers.    Assessment/Plan   Encounter Diagnoses:  Cubital tunnel syndrome on left    The patient is doing satisfactory following her left ulnar nerve transposition.  She may use her left upper extremity is much as she can tolerate.  She will follow-up in 5 to 6 weeks.

## 2025-07-30 ENCOUNTER — OFFICE VISIT (OUTPATIENT)
Dept: ORTHOPEDIC SURGERY | Facility: CLINIC | Age: 75
End: 2025-07-30
Payer: MEDICARE

## 2025-07-30 DIAGNOSIS — G56.22 CUBITAL TUNNEL SYNDROME ON LEFT: Primary | ICD-10-CM

## 2025-07-30 PROCEDURE — 1160F RVW MEDS BY RX/DR IN RCRD: CPT | Performed by: ORTHOPAEDIC SURGERY

## 2025-07-30 PROCEDURE — 1159F MED LIST DOCD IN RCRD: CPT | Performed by: ORTHOPAEDIC SURGERY

## 2025-07-30 PROCEDURE — 1036F TOBACCO NON-USER: CPT | Performed by: ORTHOPAEDIC SURGERY

## 2025-07-30 PROCEDURE — 99024 POSTOP FOLLOW-UP VISIT: CPT | Performed by: ORTHOPAEDIC SURGERY

## 2025-07-30 PROCEDURE — 99212 OFFICE O/P EST SF 10 MIN: CPT | Performed by: ORTHOPAEDIC SURGERY

## 2025-07-30 NOTE — PROGRESS NOTES
Subjective    Patient ID: Justin Roberson is a 74 y.o. female.    Chief Complaint: Post-op of the Left Hand (FUV L UNT DOS 6/6/25)     Last Surgery: Left Unlar Nerve Transposition - Left  Last Surgery Date: 6/6/2025    HPI  The patient returns for follow-up of her left ulnar nerve transposition.  She is about 6 weeks out from surgery.  She no longer has any paresthesias or numbness in her left hand.  She still notices altered sensation near the incision itself but that is also improving.    Objective   Ortho Exam  The patient is in no acute distress.  Exam of her left upper extremity reveals her incision is well-healed.  There is no evidence of infection.  Distally there is a negative Wartenberg's sign.  She has full range of motion in her wrist and fingers.    Assessment/Plan   Encounter Diagnoses:  Cubital tunnel syndrome on left    The patient is doing satisfactory following her left ulnar nerve transposition.  She will use her left upper extremity is much as she can tolerate.  She will follow-up as her symptoms dictate.

## 2025-09-25 ENCOUNTER — APPOINTMENT (OUTPATIENT)
Dept: PRIMARY CARE | Facility: CLINIC | Age: 75
End: 2025-09-25
Payer: MEDICARE

## (undated) DEVICE — Device

## (undated) DEVICE — CUFF, TOURNIQUET 24 DUAL PORT/SNGL BLAD"

## (undated) DEVICE — FORCEP, BIOPOLAR, ADSON, NON INSUL, 5IN 1.0MM

## (undated) DEVICE — VESSEL LOOP, RED MAXI, 2 CARD

## (undated) DEVICE — SLEEVE, VASO PRESS, CALF GARMENT, MEDIUM, GREEN